# Patient Record
Sex: MALE | Race: WHITE | Employment: UNEMPLOYED | URBAN - METROPOLITAN AREA
[De-identification: names, ages, dates, MRNs, and addresses within clinical notes are randomized per-mention and may not be internally consistent; named-entity substitution may affect disease eponyms.]

---

## 2017-09-22 ENCOUNTER — HOSPITAL ENCOUNTER (EMERGENCY)
Age: 27
Discharge: HOME | End: 2017-09-22
Attending: EMERGENCY MEDICINE
Payer: SELF-PAY

## 2017-09-22 VITALS
DIASTOLIC BLOOD PRESSURE: 85 MMHG | SYSTOLIC BLOOD PRESSURE: 126 MMHG | RESPIRATION RATE: 13 BRPM | HEART RATE: 86 BPM | TEMPERATURE: 97.6 F | BODY MASS INDEX: 25.11 KG/M2 | WEIGHT: 160 LBS | OXYGEN SATURATION: 100 % | HEIGHT: 67 IN

## 2017-09-22 DIAGNOSIS — R73.9 HYPERGLYCEMIA: ICD-10-CM

## 2017-09-22 DIAGNOSIS — F11.10 OPIATE ABUSE, EPISODIC (HCC): Primary | ICD-10-CM

## 2017-09-22 DIAGNOSIS — T40.601A OVERDOSE OPIATE, ACCIDENTAL OR UNINTENTIONAL, INITIAL ENCOUNTER (HCC): ICD-10-CM

## 2017-09-22 LAB
ALBUMIN SERPL-MCNC: 3.9 G/DL (ref 3.4–5)
ALBUMIN/GLOB SERPL: 1.4 {RATIO} (ref 0.8–1.7)
ALP SERPL-CCNC: 78 U/L (ref 45–117)
ALT SERPL-CCNC: 38 U/L (ref 16–61)
ANION GAP SERPL CALC-SCNC: 8 MMOL/L (ref 3–18)
AST SERPL-CCNC: 46 U/L (ref 15–37)
BASOPHILS # BLD: 0 K/UL (ref 0–0.06)
BASOPHILS NFR BLD: 0 % (ref 0–2)
BILIRUB SERPL-MCNC: 0.4 MG/DL (ref 0.2–1)
BUN SERPL-MCNC: 11 MG/DL (ref 7–18)
BUN/CREAT SERPL: 8 (ref 12–20)
CALCIUM SERPL-MCNC: 8.6 MG/DL (ref 8.5–10.1)
CHLORIDE SERPL-SCNC: 106 MMOL/L (ref 100–108)
CO2 SERPL-SCNC: 32 MMOL/L (ref 21–32)
CREAT SERPL-MCNC: 1.42 MG/DL (ref 0.6–1.3)
DIFFERENTIAL METHOD BLD: ABNORMAL
EOSINOPHIL # BLD: 0.1 K/UL (ref 0–0.4)
EOSINOPHIL NFR BLD: 1 % (ref 0–5)
ERYTHROCYTE [DISTWIDTH] IN BLOOD BY AUTOMATED COUNT: 13.2 % (ref 11.6–14.5)
ETHANOL SERPL-MCNC: <3 MG/DL (ref 0–3)
GLOBULIN SER CALC-MCNC: 2.8 G/DL (ref 2–4)
GLUCOSE SERPL-MCNC: 241 MG/DL (ref 74–99)
HCT VFR BLD AUTO: 45.7 % (ref 36–48)
HGB BLD-MCNC: 15.5 G/DL (ref 13–16)
LIPASE SERPL-CCNC: 128 U/L (ref 73–393)
LYMPHOCYTES # BLD: 2.7 K/UL (ref 0.9–3.6)
LYMPHOCYTES NFR BLD: 22 % (ref 21–52)
MCH RBC QN AUTO: 31.1 PG (ref 24–34)
MCHC RBC AUTO-ENTMCNC: 33.9 G/DL (ref 31–37)
MCV RBC AUTO: 91.6 FL (ref 74–97)
MONOCYTES # BLD: 0.8 K/UL (ref 0.05–1.2)
MONOCYTES NFR BLD: 6 % (ref 3–10)
NEUTS SEG # BLD: 8.6 K/UL (ref 1.8–8)
NEUTS SEG NFR BLD: 71 % (ref 40–73)
PLATELET # BLD AUTO: 197 K/UL (ref 135–420)
PMV BLD AUTO: 9.7 FL (ref 9.2–11.8)
POTASSIUM SERPL-SCNC: 3.8 MMOL/L (ref 3.5–5.5)
PROT SERPL-MCNC: 6.7 G/DL (ref 6.4–8.2)
RBC # BLD AUTO: 4.99 M/UL (ref 4.7–5.5)
SODIUM SERPL-SCNC: 146 MMOL/L (ref 136–145)
WBC # BLD AUTO: 12.2 K/UL (ref 4.6–13.2)

## 2017-09-22 PROCEDURE — 93005 ELECTROCARDIOGRAM TRACING: CPT

## 2017-09-22 PROCEDURE — 99284 EMERGENCY DEPT VISIT MOD MDM: CPT

## 2017-09-22 PROCEDURE — 96361 HYDRATE IV INFUSION ADD-ON: CPT

## 2017-09-22 PROCEDURE — 80307 DRUG TEST PRSMV CHEM ANLYZR: CPT | Performed by: PHYSICIAN ASSISTANT

## 2017-09-22 PROCEDURE — 80053 COMPREHEN METABOLIC PANEL: CPT | Performed by: PHYSICIAN ASSISTANT

## 2017-09-22 PROCEDURE — 74011250636 HC RX REV CODE- 250/636: Performed by: PHYSICIAN ASSISTANT

## 2017-09-22 PROCEDURE — 83690 ASSAY OF LIPASE: CPT | Performed by: PHYSICIAN ASSISTANT

## 2017-09-22 PROCEDURE — 85025 COMPLETE CBC W/AUTO DIFF WBC: CPT | Performed by: PHYSICIAN ASSISTANT

## 2017-09-22 PROCEDURE — 96374 THER/PROPH/DIAG INJ IV PUSH: CPT

## 2017-09-22 RX ORDER — NALOXONE HYDROCHLORIDE 1 MG/ML
1 INJECTION INTRAMUSCULAR; INTRAVENOUS; SUBCUTANEOUS
Status: DISCONTINUED | OUTPATIENT
Start: 2017-09-22 | End: 2017-09-22

## 2017-09-22 RX ORDER — ONDANSETRON 2 MG/ML
4 INJECTION INTRAMUSCULAR; INTRAVENOUS
Status: COMPLETED | OUTPATIENT
Start: 2017-09-22 | End: 2017-09-22

## 2017-09-22 RX ADMIN — SODIUM CHLORIDE 1000 ML: 900 INJECTION, SOLUTION INTRAVENOUS at 22:34

## 2017-09-22 RX ADMIN — ONDANSETRON 4 MG: 2 INJECTION INTRAMUSCULAR; INTRAVENOUS at 22:34

## 2017-09-23 NOTE — ED NOTES
Discharge instructions given to pt. pt verbalized understanding discharge instructions. Patient left emergency department by foot  With mother, in good condition. 0 Prescriptions given. Armband removed and shredded.

## 2017-09-23 NOTE — ED PROVIDER NOTES
HPI Comments: 10:09 PM  Kamille Rivera is a 32 y.o. male with PMHX hepatitis B and C and hx of heroine abuse presenting to the ED via EMS C/O unresponsiveness due to possible overdose, onset 30 minutes PTA. Associated sxs include decreased respiratory rate. Per EMS, pt's friends called EMS after pt disappeared into the bathroom for 30 minutes. EMS found pt in his bathroom at home laying supine, unresponsive, and breathing 8 times/minute. EMS gave 1 of Narcan and 4 of Zofran enroute, which gradually increased pt's respiratory rate, and pt became more responsive. Per EMS, pt is 99% on 12L NRB, /70, FSBS 225. Pt is awake and alert in the ED. Pt states he took 4 \"pain pills\" today, sts they were morphine but unsure of dosage. Pt notes he had half a beer today. EMS notes that pt's left hand is swollen, however per family, that is chronic in nature. Pt denies PMHX DM, CP, SOB, SI, HI, and any other symptoms or complaints. Written by RAINER Mccurdyibhi, as dictated by Yolanda Rangel PA-C     The history is provided by the EMS personnel. No  was used. Past Medical History:   Diagnosis Date    Anxiety     Hepatitis B     Hepatitis C     Heroin abuse        Past Surgical History:   Procedure Laterality Date    HX ANKLE FRACTURE TX           History reviewed. No pertinent family history. Social History     Social History    Marital status: SINGLE     Spouse name: N/A    Number of children: N/A    Years of education: N/A     Occupational History    Not on file.      Social History Main Topics    Smoking status: Current Every Day Smoker     Packs/day: 1.50    Smokeless tobacco: Not on file    Alcohol use Yes    Drug use: Yes     Special: Heroin, Cocaine    Sexual activity: Not on file     Other Topics Concern    Not on file     Social History Narrative    ** Merged History Encounter **              ALLERGIES: Review of patient's allergies indicates no known allergies. Review of Systems   Constitutional:        (+) overdose   Respiratory: Negative for shortness of breath. (+) decreased respiratory rate   Cardiovascular: Negative for chest pain. Musculoskeletal: Positive for joint swelling (chronic left hand). Psychiatric/Behavioral: Negative for suicidal ideas. (-) HI   All other systems reviewed and are negative. Vitals:    09/22/17 2216   BP: 126/85   Pulse: 86   Resp: 13   Temp: 97.6 °F (36.4 °C)   SpO2: 100%   Weight: 72.6 kg (160 lb)   Height: 5' 7\" (1.702 m)            Physical Exam   Constitutional: He is oriented to person, place, and time. He appears well-developed and well-nourished. No distress. Groggy, but arouses to voice, answers questions   HENT:   Head: Normocephalic and atraumatic. Mouth/Throat: Uvula is midline and oropharynx is clear and moist. Mucous membranes are dry. Poor dentition      Eyes: EOM are normal. Pupils are equal, round, and reactive to light. Neck: Normal range of motion. Neck supple. Cardiovascular: Normal rate, regular rhythm, normal heart sounds and intact distal pulses. No murmur heard. Pulmonary/Chest: Effort normal and breath sounds normal. No respiratory distress. He has no wheezes. He has no rales. Abdominal: Soft. Bowel sounds are normal. He exhibits no distension. There is no tenderness. There is no rebound and no guarding. Musculoskeletal:   Minimal swelling of the left hand, non tender   Neurological: He is alert and oriented to person, place, and time. Skin: Skin is warm. He is diaphoretic (slight ). Psychiatric: He has a normal mood and affect. Judgment normal.   Nursing note and vitals reviewed. RESULTS:    CARDIAC MONITOR NOTE:  Cardiac Rhythm: NSR  Rate: 67 bpm     PULSE OXIMETRY NOTE:  Pulse-ox is 100% on RA  Interpretation: normal       EKG interpretation: (Preliminary)  10:13 PM   NSR, 66 bpm, QR pattern in only V1. No blocks or arrhythmia.    EKG read by Wilfrido Irwin Tim Randle MD     No orders to display        Labs Reviewed   CBC WITH AUTOMATED DIFF - Abnormal; Notable for the following:        Result Value    ABS. NEUTROPHILS 8.6 (*)     All other components within normal limits   METABOLIC PANEL, COMPREHENSIVE - Abnormal; Notable for the following:     Sodium 146 (*)     Glucose 241 (*)     Creatinine 1.42 (*)     BUN/Creatinine ratio 8 (*)     AST (SGOT) 46 (*)     All other components within normal limits   LIPASE   ETHYL ALCOHOL       Recent Results (from the past 12 hour(s))   CBC WITH AUTOMATED DIFF    Collection Time: 09/22/17 10:10 PM   Result Value Ref Range    WBC 12.2 4.6 - 13.2 K/uL    RBC 4.99 4.70 - 5.50 M/uL    HGB 15.5 13.0 - 16.0 g/dL    HCT 45.7 36.0 - 48.0 %    MCV 91.6 74.0 - 97.0 FL    MCH 31.1 24.0 - 34.0 PG    MCHC 33.9 31.0 - 37.0 g/dL    RDW 13.2 11.6 - 14.5 %    PLATELET 266 077 - 457 K/uL    MPV 9.7 9.2 - 11.8 FL    NEUTROPHILS 71 40 - 73 %    LYMPHOCYTES 22 21 - 52 %    MONOCYTES 6 3 - 10 %    EOSINOPHILS 1 0 - 5 %    BASOPHILS 0 0 - 2 %    ABS. NEUTROPHILS 8.6 (H) 1.8 - 8.0 K/UL    ABS. LYMPHOCYTES 2.7 0.9 - 3.6 K/UL    ABS. MONOCYTES 0.8 0.05 - 1.2 K/UL    ABS. EOSINOPHILS 0.1 0.0 - 0.4 K/UL    ABS. BASOPHILS 0.0 0.0 - 0.06 K/UL    DF AUTOMATED     METABOLIC PANEL, COMPREHENSIVE    Collection Time: 09/22/17 10:10 PM   Result Value Ref Range    Sodium 146 (H) 136 - 145 mmol/L    Potassium 3.8 3.5 - 5.5 mmol/L    Chloride 106 100 - 108 mmol/L    CO2 32 21 - 32 mmol/L    Anion gap 8 3.0 - 18 mmol/L    Glucose 241 (H) 74 - 99 mg/dL    BUN 11 7.0 - 18 MG/DL    Creatinine 1.42 (H) 0.6 - 1.3 MG/DL    BUN/Creatinine ratio 8 (L) 12 - 20      GFR est AA >60 >60 ml/min/1.73m2    GFR est non-AA >60 >60 ml/min/1.73m2    Calcium 8.6 8.5 - 10.1 MG/DL    Bilirubin, total 0.4 0.2 - 1.0 MG/DL    ALT (SGPT) 38 16 - 61 U/L    AST (SGOT) 46 (H) 15 - 37 U/L    Alk.  phosphatase 78 45 - 117 U/L    Protein, total 6.7 6.4 - 8.2 g/dL    Albumin 3.9 3.4 - 5.0 g/dL Globulin 2.8 2.0 - 4.0 g/dL    A-G Ratio 1.4 0.8 - 1.7     LIPASE    Collection Time: 09/22/17 10:10 PM   Result Value Ref Range    Lipase 128 73 - 393 U/L   ETHYL ALCOHOL    Collection Time: 09/22/17 10:10 PM   Result Value Ref Range    ALCOHOL(ETHYL),SERUM <3 0 - 3 MG/DL   EKG, 12 LEAD, INITIAL    Collection Time: 09/22/17 10:13 PM   Result Value Ref Range    Ventricular Rate 66 BPM    Atrial Rate 66 BPM    P-R Interval 144 ms    QRS Duration 86 ms    Q-T Interval 394 ms    QTC Calculation (Bezet) 413 ms    Calculated P Axis 73 degrees    Calculated R Axis 75 degrees    Calculated T Axis 56 degrees    Diagnosis       Normal sinus rhythm  RSR' or QR pattern in V1 suggests right ventricular conduction delay  Borderline ECG  When compared with ECG of 24-DEC-2016 17:35,  No significant change was found          MDM  Number of Diagnoses or Management Options  Hyperglycemia:   Opiate abuse, episodic:   Overdose opiate, accidental or unintentional, initial encounter:   Diagnosis management comments: Overdose, intoxication, electrolyte imbalance, DKA, dehydration, arrhythmia        Amount and/or Complexity of Data Reviewed  Clinical lab tests: ordered and reviewed  Tests in the medicine section of CPT®: ordered and reviewed (EKG)  Discuss the patient with other providers: yes Jojo Bhakta MD (Emergency Medicine))  Independent visualization of images, tracings, or specimens: yes (EKG)      ED Course     MEDICATIONS GIVEN:  Medications   ondansetron [facility] injection 4 mg (4 mg IntraVENous Given 9/22/17 2234)   sodium chloride 0.9 % bolus infusion 1,000 mL (0 mL IntraVENous IV Completed 9/22/17 2313)        Procedures      PROGRESS NOTE:  10:09 PM  Initial assessment performed. Written by Andrew Cifuentes, ED Scribe, as dictated by Martha Mccallum PA-C    PROGRESS NOTE:   10:32 PM  Per RN, pt is refusing the Zofran and Narcan.  States he would like a drink of water and if he is able to stand \"I'll drink out of the mariam. \"  Written by [facility], ED Scribe, as dictated by Ami Pavon PA-C.      CONSULT NOTE:   10:53 PM  Ami Pavon PA-C spoke with Tessie Styles. Taylor Martinez MD   Specialty: Emergency Medicine  Discussed pt's hx, disposition, and available diagnostic and imaging results. Reviewed care plans. Consulting physician agrees with plans as outlined. States observation time for pt depends on the medication that he took. Otherwise, agrees with plans. Written by [facility], ED Scribe, as dictated by Ami Pavon PA-C. PROGRESS NOTE:   11:00 PM  Pt has been re-examined by Ami Pavon PA-C. Pt is still refusing medication. States he would like to go home. I discussed with him the need for further evaluation of his condition. Pt is adamant that he would like to go home. Written by [facility], ED Scribe, as dictated by Ami Pavon PA-C. I informed the pt that He needed further lab evaluation for adequate evaluation for his symptoms, that by refusing observation and workup He is at risk for repeat respiratory or cardiac arrest.  He is awake, alert, He understands his condition and the risks involved in leaving. When having this conversation pt is clinically aware of his surroundings and able to ask appropriate questions, the patients nurse is present and confirms He is not clinically intoxicated and able to make medical decisions. He verbalized knowing the same risks and understood it was recommended that He stay and could also return at any time. Pt also verbalized that He understood both diagnosis, risks and could return at any time. Pt was provided with warnings regarding worsening of his condition and provided instructions to follow up with [facility] tomorrow or return to the Emergency Room as soon as possible. This discussion was witnessed by Apurva oMralez RN. DISCHARGE NOTE:  11:11 PM  Myrna Kramer's  results have been reviewed with him.   He has been counseled regarding his diagnosis, treatment, and plan.  He verbally conveys understanding and agreement of the signs, symptoms, diagnosis, treatment and prognosis and additionally agrees to follow up as discussed. He also agrees with the care-plan and conveys that all of his questions have been answered. I have also provided discharge instructions for him that include: educational information regarding their diagnosis and treatment, and list of reasons why they would want to return to the ED prior to their follow-up appointment, should his condition change. He has been provided with education for proper emergency department utilization. CLINICAL IMPRESSION:    1. Opiate abuse, episodic    2. Overdose opiate, accidental or unintentional, initial encounter    3. Hyperglycemia        PLAN:  1. D/C Home  2. Discharge Medication List as of 9/22/2017 11:18 PM        3. Follow-up Information     Follow up With Details Comments Contact Info    [facility] Schedule an appointment as soon as possible for a visit in 1 day  [id number] [facility], [address] [facility],[address]    [facility] EMERGENCY DEPT  As needed, If symptoms worsen [address]ia  [address] [address]  [phone]          SCRIBE ATTESTATION:  This note is prepared by Maxwell Mueller, acting as Scribe for Lalo Quintero PA-C     PROVIDER ATTESTATION:  Lalo Quintero PA-C: The scribe's documentation has been prepared under my direction and personally reviewed by me in its entirety. I confirm that the note above accurately reflects all work, treatment, procedures, and medical decision making performed by me.

## 2017-09-23 NOTE — ED TRIAGE NOTES
Patient arrived via rescue. Per rescue, 911 was called by friends on scene after patient disappeared into bathroom for approximately 30 minutes. Rescue states upon their arrival, patient was found unresponsive with a respiratory rate of 8. Patient's ventilations were assisted via BVM and patient was administered 1mg of Narcan via IV. Rescue reports patient then began to arouse and respiratory drive increased. Patient arouses to stimulation upon arrival. Patient able to answer questions appropriately. Patient states he \"does not remember\" the events proceeding his arrival to the hospital. Patient admits to take approximately four \"pain pills\" but is unsure of name. Patient also admits to drinking \"a half a beer. \" Patient denies any other drug use. Patient denies any self-harm attempts. Sepsis Screening completed    (  )Patient meets SIRS criteria. ( x )Patient does not meet SIRS criteria.       SIRS Criteria is achieved when two or more of the following are present   Temperature < 96.8°F (36°C) or > 100.9°F (38.3°C)   Heart Rate > 90 beats per minute   Respiratory Rate > 20 breaths per minute   WBC count > 12,000 or <4,000 or > 10% bands

## 2017-09-23 NOTE — ED NOTES
Patient's mother on phone requesting to speak with patient. Per patient, he's okay to speak with mother. Patient's belongings given to security.

## 2017-09-23 NOTE — DISCHARGE INSTRUCTIONS
Opioid Overdose: Care Instructions  Your Care Instructions    You have had treatment to help your body recover from taking too much of an opioid. You are getting better, but you may not feel well for a while. It takes time for the opioids to leave your body. How long it takes to feel better depends on which drug you took and how much you took of it. Opioids include illegal drugs such as heroin, often called smack, junk, H, and ska. Opioids also include medicines that doctors prescribe to treat pain. These are medicines such as oxycodone, methadone, and buprenorphine. They are sometimes sold and used illegally. Taking too much of an opioid can be dangerous. It may cause:  · Trouble breathing. · Low blood pressure. · A low heart rate. · A coma. When the doctor treated you for the overdose, he or she may have:  · Watched your symptoms or done tests to find out what kind of drug you took. · Given you fluids. · Given you oxygen to help you breathe. · Given you a medicine called naloxone to help reverse the effects of the opioid. · Done several tests, including blood tests, to see how you're responding to treatment. The doctor also watched you carefully to make sure you were recovering safely. Follow-up care is a key part of your treatment and safety. Be sure to make and go to all appointments, and call your doctor if you are having problems. It's also a good idea to know your test results and keep a list of the medicines you take. How can you care for yourself at home? · If you take opioids regularly, your body gets used to them. This is called dependency. If you are dependent on this drug, you may have withdrawal symptoms when you stop taking it. These can include nausea, sweating, chills, diarrhea, stomach cramps, and muscle aches. Withdrawal can last up to several weeks, depending on which drug you took. You may feel very ill, but you are probably not in medical danger.   · Your doctor may give you medicine to help you feel better. To help get through withdrawal, you can also:  ¨ Get plenty of rest.  ¨ Drink plenty of fluids. ¨ Stay active, but don't tire yourself. ¨ Eat a healthy diet. · If you had a tube in your throat to help you breathe, you may have a sore throat or hoarseness that can last a few days. Sip liquids to help soothe your throat. · Do not drink alcohol or take illegal drugs. · Do not drive if you feel sleepy or groggy while you recover from your overdose. · Get help to stop using drugs. Talk to your doctor about drug treatment programs. · Talk to your doctor or pharmacist about having a naloxone rescue kit on hand. When should you call for help? Call 911 anytime you think you may need emergency care. For example, call if:  · You feel you cannot stop from hurting yourself or someone else. Call your doctor now or seek immediate medical care if:  · You have new or worse withdrawal symptoms, such as:  ¨ Stomach cramps. ¨ Vomiting. ¨ Diarrhea. ¨ Muscle aches. ¨ Sweating. Watch closely for changes in your health, and be sure to contact your doctor if:  · You do not get better as expected. Where can you learn more? Go to http://sujatha-danielle.info/. Enter 872 18 [id number] in the search box to learn more about \"Opioid Overdose: Care Instructions. \"  Current as of: February 21, 2017  Content Version: 11.3  © 9362-9423 [employer]. Care instructions adapted under license by [employer] (which disclaims liability or warranty for this information). If you have questions about a medical condition or this instruction, always ask your healthcare professional. [place] [address] any warranty or liability for your use of this information.

## 2017-09-23 NOTE — ED NOTES
Patient more alert. Patient refuses medications at this time. Patient requesting water. Patient informed that he is unable to have any food or drink until labs have resulted. Patient states \"If I can make it out of this bed and to that sink, I'm going to drink something. \" PA informed and at bedside to speak with patient. Patient states \"I don't even want to be here. \" Patient informed of risks at this time and agrees to stay until labs have resulted.

## 2017-09-24 LAB
ATRIAL RATE: 66 BPM
CALCULATED P AXIS, ECG09: 73 DEGREES
CALCULATED R AXIS, ECG10: 75 DEGREES
CALCULATED T AXIS, ECG11: 56 DEGREES
DIAGNOSIS, 93000: NORMAL
P-R INTERVAL, ECG05: 144 MS
Q-T INTERVAL, ECG07: 394 MS
QRS DURATION, ECG06: 86 MS
QTC CALCULATION (BEZET), ECG08: 413 MS
VENTRICULAR RATE, ECG03: 66 BPM

## 2019-08-14 ENCOUNTER — HOSPITAL ENCOUNTER (EMERGENCY)
Age: 29
Discharge: HOME | End: 2019-08-14
Attending: EMERGENCY MEDICINE
Payer: MEDICAID

## 2019-08-14 VITALS
SYSTOLIC BLOOD PRESSURE: 124 MMHG | TEMPERATURE: 99.1 F | HEART RATE: 50 BPM | WEIGHT: 180 LBS | BODY MASS INDEX: 28.25 KG/M2 | HEIGHT: 67 IN | DIASTOLIC BLOOD PRESSURE: 78 MMHG | OXYGEN SATURATION: 100 % | RESPIRATION RATE: 16 BRPM

## 2019-08-14 DIAGNOSIS — F41.1 ANXIETY STATE: Primary | ICD-10-CM

## 2019-08-14 PROCEDURE — 74011250637 HC RX REV CODE- 250/637: Performed by: PHYSICIAN ASSISTANT

## 2019-08-14 PROCEDURE — 99283 EMERGENCY DEPT VISIT LOW MDM: CPT

## 2019-08-14 RX ORDER — HYDROXYZINE PAMOATE 100 MG/1
100 CAPSULE ORAL
COMMUNITY

## 2019-08-14 RX ORDER — LEVOTHYROXINE SODIUM 75 UG/1
TABLET ORAL
COMMUNITY

## 2019-08-14 RX ORDER — HYDROXYZINE 50 MG/1
50 TABLET, FILM COATED ORAL
Qty: 10 TAB | Refills: 0 | Status: SHIPPED | OUTPATIENT
Start: 2019-08-14

## 2019-08-14 RX ORDER — OLANZAPINE 15 MG/1
15 TABLET ORAL
COMMUNITY

## 2019-08-14 RX ORDER — LITHIUM CARBONATE 300 MG/1
CAPSULE ORAL
COMMUNITY

## 2019-08-14 RX ORDER — LORAZEPAM 1 MG/1
2 TABLET ORAL
Status: COMPLETED | OUTPATIENT
Start: 2019-08-14 | End: 2019-08-14

## 2019-08-14 RX ADMIN — LORAZEPAM 2 MG: 1 TABLET ORAL at 21:33

## 2019-08-15 NOTE — DISCHARGE INSTRUCTIONS
Patient Education        Learning About Anxiety Disorders  What are anxiety disorders? Anxiety disorders are a type of medical problem. They cause severe anxiety. When you feel anxious, you feel that something bad is about to happen. This feeling interferes with your life. These disorders include:  · Generalized anxiety disorder. You feel worried and stressed about many everyday events and activities. This goes on for several months and disrupts your life on most days. · Panic disorder. You have repeated panic attacks. A panic attack is a sudden, intense fear or anxiety. It may make you feel short of breath. Your heart may pound. · Social anxiety disorder. You feel very anxious about what you will say or do in front of people. For example, you may be scared to talk or eat in public. This problem affects your daily life. · Phobias. You are very scared of a specific object, situation, or activity. For example, you may fear spiders, high places, or small spaces. What are the symptoms? Generalized anxiety disorder  Symptoms may include:  · Feeling worried and stressed about many things almost every day. · Feeling tired or irritable. You may have a hard time concentrating. · Having headaches or muscle aches. · Having a hard time getting to sleep or staying asleep. Panic disorder  You may have repeated panic attacks when there is no reason for feeling afraid. You may change your daily activities because you worry that you will have another attack. Symptoms may include:  · Intense fear, terror, or anxiety. · Trouble breathing or very fast breathing. · Chest pain or tightness. · A heartbeat that races or is not regular. Social anxiety disorder  Symptoms may include:  · Fear about a social situation, such as eating in front of others or speaking in public. You may worry a lot. Or you may be afraid that something bad will happen. · Anxiety that can cause you to blush, sweat, and feel shaky.   · A heartbeat that is faster than normal.  · A hard time focusing. Phobias  Symptoms may include:  · More fear than most people of being around an object, being in a situation, or doing an activity. You might also be stressed about the chance of being around the thing you fear. · Worry about losing control, panicking, fainting, or having physical symptoms like a faster heartbeat when you are around the situation or object. How are these disorders treated? Anxiety disorders can be treated with medicines or counseling. A combination of both may be used. Medicines may include:  · Antidepressants. These may help your symptoms by keeping chemicals in your brain in balance. · Benzodiazepines. These may give you short-term relief of your symptoms. Some people use cognitive-behavioral therapy. A therapist helps you learn to change stressful or bad thoughts into helpful thoughts. Lead a healthy lifestyle  A healthy lifestyle may help you feel better. · Get at least 30 minutes of exercise on most days of the week. Walking is a good choice. · Eat a healthy diet. Include fruits, vegetables, lean proteins, and whole grains in your diet each day. · Try to go to bed at the same time every night. Try for 8 hours of sleep a night. · Find ways to manage stress. Try relaxation exercises. · Avoid alcohol and illegal drugs. Follow-up care is a key part of your treatment and safety. Be sure to make and go to all appointments, and call your doctor if you are having problems. It's also a good idea to know your test results and keep a list of the medicines you take. Where can you learn more? Go to http://sujatha-danielle.info/. Enter F619 in the search box to learn more about \"Learning About Anxiety Disorders. \"  Current as of: September 11, 2018  Content Version: 12.1  © 1326-6440 [employer].  Care instructions adapted under license by [employer] (which disclaims liability or warranty for this information). If you have questions about a medical condition or this instruction, always ask your healthcare professional. [place] [address] any warranty or liability for your use of this information.

## 2019-08-15 NOTE — ED TRIAGE NOTES
Pt present to ED through triage Pt reports has not had medications in 2 weeks. Pt reports increased anxiety, pt states \" all I want to do is sleep\". Pt alert and oriented no acute distress noted pt able to speak in full sentences.

## 2019-08-15 NOTE — ED PROVIDER NOTES
EMERGENCY DEPARTMENT HISTORY AND PHYSICAL EXAM    Date: 8/14/2019  Patient Name: Refugia Cogan    History of Presenting Illness     No chief complaint on file. History Provided By: Patient and his mother    Refugia Cogan is a 29 y.o. male with PMHX of anxiety, polysubstance abuse drug addiction bipolar recent incarceration who presents to the emergency department C/O medication refill. Associated sxs include anxiety. Patient reports recent incarcerations who just got out of group home 2 weeks ago. Patient is taking Zyprexa and lithium while in group home for his mental illness. Patient states upon discharge she had no medications and has been out of them since leaving the group home. He has been in contact with a CSB he has upcoming appointment patient asking for refills of Atarax Zyprexa and lithium. Patient's mother is with him states that she will be taking him tomorrow to a psychiatric facility in [facility] that she is Isaac been in contact with for [employer] to help. Patient expresses concern for being unable to sleep and is asking for something to help him sleep tonight. Pt denies homicidal or suicidal ideation, vomiting, chest pain, shortness of breath, and any other sxs or complaints. PCP: None    Current Facility-Administered Medications   Medication Dose Route Frequency Provider Last Rate Last Dose    LORazepam (ATIVAN) tablet 2 mg  2 mg Oral NOW Dilip Griffith PA         Current Outpatient Medications   Medication Sig Dispense Refill    OLANZapine (ZYPREXA) 15 mg tablet Take 15 mg by mouth nightly.  lithium carbonate 300 mg capsule Take  by mouth three (3) times daily (with meals).  hydrOXYzine pamoate (VISTARIL) 100 mg capsule Take 100 mg by mouth three (3) times daily as needed for Itching.  levothyroxine (SYNTHROID) 75 mcg tablet Take  by mouth Daily (before breakfast).  Indications: hypothyroidism      hydrOXYzine HCl (ATARAX) 50 mg tablet Take 1 Tab by mouth every six (6) hours as needed for Itching. 10 Tab 0    albuterol (PROVENTIL HFA, VENTOLIN HFA, PROAIR HFA) 90 mcg/actuation inhaler Take 2 Puffs by inhalation every four (4) hours as needed for Wheezing. 1 Inhaler 1    ALPRAZOLAM (XANAX PO) Take 1 mg by mouth. Past History     Past Medical History:  Past Medical History:   Diagnosis Date    Anxiety     Bipolar I disorder, current or most recent episode depressed, with psychotic features with anxious distress ([facility]     Hepatitis B     Hepatitis C     Hepatitis C, chronic (HCC)     Heroin abuse ([facility]     Infectious disease     Psychiatric disorder     Schizoaffective disorder, depressive type with good prognostic features ([facility]        Past Surgical History:  Past Surgical History:   Procedure Laterality Date    HX ANKLE FRACTURE TX         Family History:  History reviewed. No pertinent family history. Social History:  Social History     Tobacco Use    Smoking status: Current Every Day Smoker     Packs/day: 1.50    Smokeless tobacco: Never Used   Substance Use Topics    Alcohol use: Yes     Alcohol/week: 2.0 standard drinks     Types: 2 Cans of beer per week    Drug use: Yes     Types: Heroin, Cocaine       Allergies: Allergies   Allergen Reactions    Tylenol [Acetaminophen] Other (comments)     Pt reports has Hep C cant take tylenol            Review of Systems   Review of Systems   Constitutional: Negative for fever. Gastrointestinal: Negative for vomiting. Psychiatric/Behavioral: Positive for sleep disturbance. Negative for agitation, behavioral problems, hallucinations and suicidal ideas. The patient is nervous/anxious. All other systems reviewed and are negative. Physical Exam     Vitals:    08/14/19 2041   BP: 124/78   Pulse: (!) 50   Resp: 16   Temp: 99.1 °F (37.3 °C)   SpO2: 100%   Weight: 81.6 kg (180 lb)   Height: 5' 7\" (1.702 m)     Physical Exam   Constitutional: He is oriented to person, place, and time.  He appears well-developed and well-nourished. No distress. Heavily tattooed sitting in exam chair appears comfortable answers questions appropriately makes good eye contact   HENT:   Head: Normocephalic and atraumatic. Eyes: Pupils are equal, round, and reactive to light. Conjunctivae and EOM are normal.   Neck: Normal range of motion. Neck supple. Cardiovascular: Normal rate and regular rhythm. Pulmonary/Chest: Effort normal and breath sounds normal.   Musculoskeletal: Normal range of motion. Neurological: He is alert and oriented to person, place, and time. Skin: Skin is warm and dry. Psychiatric: His speech is normal and behavior is normal. His mood appears anxious. His affect is not inappropriate. Thought content is not paranoid. Cognition and memory are normal. He expresses no homicidal and no suicidal ideation. Nursing note and vitals reviewed. Diagnostic Study Results     Labs -   No results found for this or any previous visit (from the past 12 hour(s)). Radiologic Studies -   No orders to display     CT Results  (Last 48 hours)    None        CXR Results  (Last 48 hours)    None          Medications given in the ED-  Medications   LORazepam (ATIVAN) tablet 2 mg (has no administration in time range)         Medical Decision Making   I am the first provider for this patient. I reviewed the vital signs, available nursing notes, past medical history, past surgical history, family history and social history. Vital Signs-Reviewed the patient's vital signs. Pulse Oximetry Analysis - 100% on RA     Records Reviewed: Nursing Notes    Procedures:  Procedures    ED Course:   9:08 PM   Initial assessment performed. The patients presenting problems have been discussed, and they are in agreement with the care plan formulated and outlined with them. I have encouraged them to ask questions as they arise throughout their visit.     Discussion: 29 y.o. male with history of mental illness and drug addiction recent incarceration's been out of Zyprexa Atarax and lithium for 2 weeks asking for medication refill. Patient is not suicidal or homicidal.  He has appropriate vital signs. His mother is with him and has plan to take him to a psychiatric facility tomorrow morning she does not need help arranging this. Mother states patient is safe with her. They are asking for refills of medications. Proximal and lithium I will not be writing for patient encouraged to see a CSB for this. And write for 10 Atarax tablets, 1 p.o. Ativan now. Diagnosis and Disposition       DISCHARGE NOTE:  Lucio Kramer's  results have been reviewed with him. He has been counseled regarding his diagnosis, treatment, and plan. He verbally conveys understanding and agreement of the signs, symptoms, diagnosis, treatment and prognosis and additionally agrees to follow up as discussed. He also agrees with the care-plan and conveys that all of his questions have been answered. I have also provided discharge instructions for him that include: educational information regarding their diagnosis and treatment, and list of reasons why they would want to return to the ED prior to their follow-up appointment, should his condition change. He has been provided with education for proper emergency department utilization. CLINICAL IMPRESSION:    1. Anxiety state        PLAN:  1. D/C Home  2. Current Discharge Medication List      START taking these medications    Details   hydrOXYzine HCl (ATARAX) 50 mg tablet Take 1 Tab by mouth every six (6) hours as needed for Itching. Qty: 10 Tab, Refills: 0           3.    Follow-up Information     Follow up With Specialties Details Why Contact Info    your PCP  Schedule an appointment as soon as possible for a visit      [facility] EMERGENCY DEPT Emergency Medicine  As needed, If symptoms worsen [address] Shaji Rao [address] ([facility])  Go to  [address] [facility] [address]              Please note that this dictation was completed with [employer], the computer voice recognition software. Quite often unanticipated grammatical, syntax, homophones, and other interpretive errors are inadvertently transcribed by the computer software. Please disregard these errors. Please excuse any errors that have escaped final proofreading.

## 2019-11-28 ENCOUNTER — HOSPITAL ENCOUNTER (INPATIENT)
Age: 29
LOS: 4 days | Discharge: HOME | DRG: 816 | End: 2019-12-02
Attending: EMERGENCY MEDICINE | Admitting: FAMILY MEDICINE
Payer: MEDICAID

## 2019-11-28 ENCOUNTER — APPOINTMENT (OUTPATIENT)
Dept: CT IMAGING | Age: 29
DRG: 816 | End: 2019-11-28
Attending: PHYSICIAN ASSISTANT
Payer: MEDICAID

## 2019-11-28 ENCOUNTER — APPOINTMENT (OUTPATIENT)
Dept: GENERAL RADIOLOGY | Age: 29
DRG: 816 | End: 2019-11-28
Attending: PHYSICIAN ASSISTANT
Payer: MEDICAID

## 2019-11-28 DIAGNOSIS — R09.02 HYPOXIA: ICD-10-CM

## 2019-11-28 DIAGNOSIS — T40.1X1A OVERDOSE OF HEROIN, ACCIDENTAL OR UNINTENTIONAL, INITIAL ENCOUNTER (HCC): ICD-10-CM

## 2019-11-28 DIAGNOSIS — J81.1 NONCARDIOGENIC PULMONARY EDEMA: Primary | ICD-10-CM

## 2019-11-28 PROBLEM — N28.9 RENAL INSUFFICIENCY: Status: ACTIVE | Noted: 2019-11-28

## 2019-11-28 LAB
ALBUMIN SERPL-MCNC: 3.8 G/DL (ref 3.4–5)
ALBUMIN/GLOB SERPL: 1.4 {RATIO} (ref 0.8–1.7)
ALP SERPL-CCNC: 88 U/L (ref 45–117)
ALT SERPL-CCNC: 51 U/L (ref 16–61)
ANION GAP SERPL CALC-SCNC: 11 MMOL/L (ref 3–18)
APAP SERPL-MCNC: <2 UG/ML (ref 10–30)
APTT PPP: 24.3 SEC (ref 23–36.4)
ARTERIAL PATENCY WRIST A: YES
AST SERPL-CCNC: 38 U/L (ref 10–38)
BASE EXCESS BLD CALC-SCNC: 0 MMOL/L
BASOPHILS # BLD: 0 K/UL (ref 0–0.1)
BASOPHILS NFR BLD: 0 % (ref 0–2)
BDY SITE: NORMAL
BILIRUB SERPL-MCNC: 0.5 MG/DL (ref 0.2–1)
BNP SERPL-MCNC: 32 PG/ML (ref 0–450)
BUN SERPL-MCNC: 12 MG/DL (ref 7–18)
BUN/CREAT SERPL: 9 (ref 12–20)
CALCIUM SERPL-MCNC: 8.6 MG/DL (ref 8.5–10.1)
CHLORIDE SERPL-SCNC: 101 MMOL/L (ref 100–111)
CK MB CFR SERPL CALC: 1.4 % (ref 0–4)
CK MB SERPL-MCNC: 2.3 NG/ML (ref 5–25)
CK SERPL-CCNC: 159 U/L (ref 39–308)
CO2 SERPL-SCNC: 27 MMOL/L (ref 21–32)
CREAT SERPL-MCNC: 1.31 MG/DL (ref 0.6–1.3)
D DIMER PPP FEU-MCNC: 3.61 UG/ML(FEU)
DIFFERENTIAL METHOD BLD: ABNORMAL
EOSINOPHIL # BLD: 0.1 K/UL (ref 0–0.4)
EOSINOPHIL NFR BLD: 1 % (ref 0–5)
ERYTHROCYTE [DISTWIDTH] IN BLOOD BY AUTOMATED COUNT: 14.7 % (ref 11.6–14.5)
ETHANOL SERPL-MCNC: <3 MG/DL (ref 0–3)
GAS FLOW.O2 O2 DELIVERY SYS: NORMAL L/MIN
GAS FLOW.O2 SETTING OXYMISER: 15 L/M
GLOBULIN SER CALC-MCNC: 2.8 G/DL (ref 2–4)
GLUCOSE SERPL-MCNC: 139 MG/DL (ref 74–99)
HCO3 BLD-SCNC: 24.9 MMOL/L (ref 22–26)
HCT VFR BLD AUTO: 51.1 % (ref 36–48)
HGB BLD-MCNC: 17.1 G/DL (ref 13–16)
INR PPP: 0.9 (ref 0.8–1.2)
LACTATE BLD-SCNC: 1.67 MMOL/L (ref 0.4–2)
LYMPHOCYTES # BLD: 2 K/UL (ref 0.9–3.6)
LYMPHOCYTES NFR BLD: 18 % (ref 21–52)
MCH RBC QN AUTO: 29.6 PG (ref 24–34)
MCHC RBC AUTO-ENTMCNC: 33.5 G/DL (ref 31–37)
MCV RBC AUTO: 88.4 FL (ref 74–97)
MONOCYTES # BLD: 0.7 K/UL (ref 0.05–1.2)
MONOCYTES NFR BLD: 6 % (ref 3–10)
NEUTS SEG # BLD: 8.3 K/UL (ref 1.8–8)
NEUTS SEG NFR BLD: 75 % (ref 40–73)
O2/TOTAL GAS SETTING VFR VENT: 100 %
PCO2 BLD: 39.8 MMHG (ref 35–45)
PH BLD: 7.4 [PH] (ref 7.35–7.45)
PLATELET # BLD AUTO: 228 K/UL (ref 135–420)
PMV BLD AUTO: 9.4 FL (ref 9.2–11.8)
PO2 BLD: 88 MMHG (ref 80–100)
POTASSIUM SERPL-SCNC: 3.6 MMOL/L (ref 3.5–5.5)
PROT SERPL-MCNC: 6.6 G/DL (ref 6.4–8.2)
PROTHROMBIN TIME: 12.4 SEC (ref 11.5–15.2)
RBC # BLD AUTO: 5.78 M/UL (ref 4.7–5.5)
SALICYLATES SERPL-MCNC: <1.7 MG/DL (ref 2.8–20)
SAO2 % BLD: 97 % (ref 92–97)
SERVICE CMNT-IMP: NORMAL
SODIUM SERPL-SCNC: 139 MMOL/L (ref 136–145)
SPECIMEN TYPE: NORMAL
TOTAL RESP. RATE, ITRR: 17
TROPONIN I SERPL-MCNC: <0.02 NG/ML (ref 0–0.04)
WBC # BLD AUTO: 11.1 K/UL (ref 4.6–13.2)

## 2019-11-28 PROCEDURE — 83880 ASSAY OF NATRIURETIC PEPTIDE: CPT

## 2019-11-28 PROCEDURE — 71045 X-RAY EXAM CHEST 1 VIEW: CPT

## 2019-11-28 PROCEDURE — 74011250636 HC RX REV CODE- 250/636: Performed by: FAMILY MEDICINE

## 2019-11-28 PROCEDURE — 96374 THER/PROPH/DIAG INJ IV PUSH: CPT

## 2019-11-28 PROCEDURE — 99285 EMERGENCY DEPT VISIT HI MDM: CPT

## 2019-11-28 PROCEDURE — 74011636320 HC RX REV CODE- 636/320: Performed by: EMERGENCY MEDICINE

## 2019-11-28 PROCEDURE — 82803 BLOOD GASES ANY COMBINATION: CPT

## 2019-11-28 PROCEDURE — 36600 WITHDRAWAL OF ARTERIAL BLOOD: CPT

## 2019-11-28 PROCEDURE — 74011250636 HC RX REV CODE- 250/636: Performed by: PHYSICIAN ASSISTANT

## 2019-11-28 PROCEDURE — 85379 FIBRIN DEGRADATION QUANT: CPT

## 2019-11-28 PROCEDURE — 65660000000 HC RM CCU STEPDOWN

## 2019-11-28 PROCEDURE — 93005 ELECTROCARDIOGRAM TRACING: CPT

## 2019-11-28 PROCEDURE — 71275 CT ANGIOGRAPHY CHEST: CPT

## 2019-11-28 PROCEDURE — 74011000258 HC RX REV CODE- 258: Performed by: FAMILY MEDICINE

## 2019-11-28 PROCEDURE — 80178 ASSAY OF LITHIUM: CPT

## 2019-11-28 PROCEDURE — 80307 DRUG TEST PRSMV CHEM ANLYZR: CPT

## 2019-11-28 PROCEDURE — 85025 COMPLETE CBC W/AUTO DIFF WBC: CPT

## 2019-11-28 PROCEDURE — 80074 ACUTE HEPATITIS PANEL: CPT

## 2019-11-28 PROCEDURE — 85730 THROMBOPLASTIN TIME PARTIAL: CPT

## 2019-11-28 PROCEDURE — 74011250636 HC RX REV CODE- 250/636

## 2019-11-28 PROCEDURE — 85610 PROTHROMBIN TIME: CPT

## 2019-11-28 PROCEDURE — 96376 TX/PRO/DX INJ SAME DRUG ADON: CPT

## 2019-11-28 PROCEDURE — 83605 ASSAY OF LACTIC ACID: CPT

## 2019-11-28 PROCEDURE — 82550 ASSAY OF CK (CPK): CPT

## 2019-11-28 PROCEDURE — 96361 HYDRATE IV INFUSION ADD-ON: CPT

## 2019-11-28 PROCEDURE — 80053 COMPREHEN METABOLIC PANEL: CPT

## 2019-11-28 PROCEDURE — 87040 BLOOD CULTURE FOR BACTERIA: CPT

## 2019-11-28 RX ORDER — SODIUM CHLORIDE 9 MG/ML
75 INJECTION, SOLUTION INTRAVENOUS CONTINUOUS
Status: DISCONTINUED | OUTPATIENT
Start: 2019-11-28 | End: 2019-11-29

## 2019-11-28 RX ORDER — LEVOTHYROXINE SODIUM 75 UG/1
75 TABLET ORAL
Status: DISCONTINUED | OUTPATIENT
Start: 2019-11-29 | End: 2019-12-02 | Stop reason: HOSPADM

## 2019-11-28 RX ORDER — VANCOMYCIN 2 GRAM/500 ML IN 0.9 % SODIUM CHLORIDE INTRAVENOUS
2000 ONCE
Status: COMPLETED | OUTPATIENT
Start: 2019-11-28 | End: 2019-11-28

## 2019-11-28 RX ORDER — CLINDAMYCIN PHOSPHATE 600 MG/50ML
600 INJECTION, SOLUTION INTRAVENOUS EVERY 8 HOURS
Status: DISCONTINUED | OUTPATIENT
Start: 2019-11-28 | End: 2019-11-29

## 2019-11-28 RX ORDER — ONDANSETRON 2 MG/ML
4 INJECTION INTRAMUSCULAR; INTRAVENOUS
Status: COMPLETED | OUTPATIENT
Start: 2019-11-28 | End: 2019-11-28

## 2019-11-28 RX ORDER — LITHIUM CARBONATE 300 MG/1
300 CAPSULE ORAL 3 TIMES DAILY
Status: DISCONTINUED | OUTPATIENT
Start: 2019-11-28 | End: 2019-12-02 | Stop reason: HOSPADM

## 2019-11-28 RX ORDER — LEVOFLOXACIN 5 MG/ML
750 INJECTION, SOLUTION INTRAVENOUS EVERY 24 HOURS
Status: DISCONTINUED | OUTPATIENT
Start: 2019-11-28 | End: 2019-12-02 | Stop reason: HOSPADM

## 2019-11-28 RX ORDER — ONDANSETRON 2 MG/ML
INJECTION INTRAMUSCULAR; INTRAVENOUS
Status: DISPENSED
Start: 2019-11-28 | End: 2019-11-29

## 2019-11-28 RX ORDER — NALOXONE HYDROCHLORIDE 1 MG/ML
INJECTION INTRAMUSCULAR; INTRAVENOUS; SUBCUTANEOUS
Status: COMPLETED
Start: 2019-11-28 | End: 2019-11-28

## 2019-11-28 RX ORDER — ONDANSETRON 2 MG/ML
4 INJECTION INTRAMUSCULAR; INTRAVENOUS
Status: DISCONTINUED | OUTPATIENT
Start: 2019-11-28 | End: 2019-12-02 | Stop reason: HOSPADM

## 2019-11-28 RX ADMIN — SODIUM CHLORIDE 1000 ML: 900 INJECTION, SOLUTION INTRAVENOUS at 18:34

## 2019-11-28 RX ADMIN — ONDANSETRON 4 MG: 2 INJECTION INTRAMUSCULAR; INTRAVENOUS at 18:37

## 2019-11-28 RX ADMIN — SODIUM CHLORIDE 75 ML/HR: 900 INJECTION, SOLUTION INTRAVENOUS at 21:56

## 2019-11-28 RX ADMIN — VANCOMYCIN HYDROCHLORIDE 2000 MG: 10 INJECTION, POWDER, LYOPHILIZED, FOR SOLUTION INTRAVENOUS at 21:57

## 2019-11-28 RX ADMIN — LEVOFLOXACIN 750 MG: 5 INJECTION, SOLUTION INTRAVENOUS at 22:12

## 2019-11-28 RX ADMIN — ONDANSETRON 4 MG: 2 INJECTION INTRAMUSCULAR; INTRAVENOUS at 19:23

## 2019-11-28 RX ADMIN — PIPERACILLIN AND TAZOBACTAM 4.5 G: 4; .5 INJECTION, POWDER, LYOPHILIZED, FOR SOLUTION INTRAVENOUS; PARENTERAL at 20:59

## 2019-11-28 RX ADMIN — NALOXONE HYDROCHLORIDE 1 MG: 1 INJECTION PARENTERAL at 19:09

## 2019-11-28 RX ADMIN — IOPAMIDOL 100 ML: 755 INJECTION, SOLUTION INTRAVENOUS at 20:39

## 2019-11-28 RX ADMIN — CLINDAMYCIN PHOSPHATE 600 MG: 600 INJECTION, SOLUTION INTRAVENOUS at 23:42

## 2019-11-28 RX ADMIN — FAMOTIDINE 20 MG: 10 INJECTION, SOLUTION INTRAVENOUS at 22:10

## 2019-11-28 RX ADMIN — SODIUM CHLORIDE 1000 ML: 900 INJECTION, SOLUTION INTRAVENOUS at 18:40

## 2019-11-28 NOTE — ED PROVIDER NOTES
EMERGENCY DEPARTMENT HISTORY AND PHYSICAL EXAM    Date: 11/28/2019  Patient Name: Benjamin    History of Presenting Illness     Chief Complaint   Patient presents with    Drug Overdose         History Provided By: Patient    Chief Complaint: drug overdose    HPI(Context):   6:53 PM  Benjamin is a 29 y.o. male with PMHX of heroin use, bipolar 1, schizoaffective, Hepatitis C, and anxiety who presents to the emergency department via EMS for suspected heroin overdose. Per RN, EMS pt given 4 mg narcan as RR was 4 when patient was found at his home. Family members called EMS. Pt was placed on NC and was 86% on arrival to ED on 2L. Pt placed on non-rebreather with sats in mid 90s. Pt c/o SOB. No chest pain. Associated sxs include cough x few weeks. Pt admits to IV heroin use but denies any other illicit drugs or alcohol. Pt endorses tobacco use. Pt denies fever, chills, chest pain, LE swelling, and any other sxs or complaints.      PCP: None    Current Facility-Administered Medications   Medication Dose Route Frequency Provider Last Rate Last Dose    levoFLOXacin (LEVAQUIN) 750 mg in D5W IVPB  750 mg IntraVENous Q24H Sandy Carter PA-C 100 mL/hr at 11/28/19 2212 750 mg at 11/28/19 2212    clindamycin (CLEOCIN) 600mg NS 50 mL IVPB (premix)  600 mg IntraVENous Q8H Sandy Carter PA-C   600 mg at 11/28/19 2342    Vancomycin-Rx to Dose & Monitor  1 Each Other Rx Dosing/Monitoring Sandy Carter PA-C        vancomycin (VANCOCIN) 1,250 mg in 0.9% sodium chloride 250 mL (VIAL-MATE)  1,250 mg IntraVENous Q12H Sandy Carter PA-C        levothyroxine (SYNTHROID) tablet 75 mcg  75 mcg Oral ACB Wood Rogers MD        lithium carbonate capsule 300 mg  300 mg Oral TID Wood Rogers MD   Stopped at 11/28/19 2200    0.9% sodium chloride infusion  75 mL/hr IntraVENous CONTINUOUS Wood Rogers MD 75 mL/hr at 11/28/19 2156 75 mL/hr at 11/28/19 2156    ondansetron (ZOFRAN) injection 4 mg  4 mg IntraVENous Q4H PRN Stanford Canas MD        famotidine (PF) (PEPCID) 20 mg in 0.9% sodium chloride 10 mL injection  20 mg IntraVENous Q12H Stanford Canas MD   20 mg at 11/28/19 2210       Past History     Past Medical History:  Past Medical History:   Diagnosis Date    Anxiety     Bipolar I disorder, current or most recent episode depressed, with psychotic features with anxious distress [facility]     Hepatitis B     Hepatitis C     Hepatitis C, chronic (HCC)     Heroin abuse ([facility]     HIV (human immunodeficiency virus infection) ([facility]     Infectious disease     Psychiatric disorder     Schizoaffective disorder, depressive type with good prognostic features ([facility]        Past Surgical History:  Past Surgical History:   Procedure Laterality Date    HX ANKLE FRACTURE TX         Family History:  History reviewed. No pertinent family history. Social History:  Social History     Tobacco Use    Smoking status: Current Every Day Smoker     Packs/day: 1.50    Smokeless tobacco: Never Used   Substance Use Topics    Alcohol use: Yes     Alcohol/week: 2.0 standard drinks     Types: 2 Cans of beer per week    Drug use: Yes     Types: Heroin, Cocaine       Allergies: Allergies   Allergen Reactions    Tylenol [Acetaminophen] Other (comments)     Pt reports has Hep C cant take tylenol            Review of Systems   Review of Systems   Constitutional: Negative for chills and fever. Respiratory: Positive for cough and shortness of breath. Negative for wheezing. Cardiovascular: Negative for chest pain and leg swelling. Gastrointestinal: Positive for nausea. Negative for vomiting. Neurological: Negative for dizziness and light-headedness. Psychiatric/Behavioral: The patient is nervous/anxious. All other systems reviewed and are negative.       Physical Exam     Vitals:    11/29/19 0155 11/29/19 0504 11/29/19 0506 11/29/19 0731   BP: 91/56 99/57  103/60   Pulse: 61 84  71   Resp: 20 18  16 Temp: 99.1 °F (37.3 °C) 98.2 °F (36.8 °C)  98.4 °F (36.9 °C)   SpO2: 93% 97%  94%   Weight:   76.7 kg (169 lb)    Height:         Physical Exam  Vitals signs and nursing note reviewed. Constitutional:       General: He is awake. Appearance: He is well-developed. He is not diaphoretic. Comments:  male that appears anxious. Mild tachypnea. On NC   HENT:      Head: Normocephalic and atraumatic. Right Ear: External ear normal.      Left Ear: External ear normal.      Nose: Nose normal.   Eyes:      General: No scleral icterus. Right eye: No discharge. Left eye: No discharge. Conjunctiva/sclera: Conjunctivae normal.   Neck:      Musculoskeletal: Normal range of motion and neck supple. Cardiovascular:      Rate and Rhythm: Normal rate and regular rhythm. Heart sounds: Normal heart sounds. No murmur. No friction rub. No gallop. Pulmonary:      Effort: Pulmonary effort is normal. Tachypnea present. No accessory muscle usage or respiratory distress. Breath sounds: Normal breath sounds. No decreased breath sounds, wheezing, rhonchi or rales. Abdominal:      Palpations: Abdomen is soft. Musculoskeletal: Normal range of motion. General: No tenderness. Lymphadenopathy:      Cervical: No cervical adenopathy. Skin:     General: Skin is warm and dry. Neurological:      Mental Status: He is alert and oriented to person, place, and time.    Psychiatric:         Judgment: Judgment normal.             Diagnostic Study Results     Labs -     Recent Results (from the past 12 hour(s))   METABOLIC PANEL, COMPREHENSIVE    Collection Time: 11/29/19 12:50 AM   Result Value Ref Range    Sodium 140 136 - 145 mmol/L    Potassium 4.1 3.5 - 5.5 mmol/L    Chloride 106 100 - 111 mmol/L    CO2 27 21 - 32 mmol/L    Anion gap 7 3.0 - 18 mmol/L    Glucose 95 74 - 99 mg/dL    BUN 11 7.0 - 18 MG/DL    Creatinine 0.98 0.6 - 1.3 MG/DL    BUN/Creatinine ratio 11 (L) 12 - 20 GFR est AA >60 >60 ml/min/1.73m2    GFR est non-AA >60 >60 ml/min/1.73m2    Calcium 7.9 (L) 8.5 - 10.1 MG/DL    Bilirubin, total 1.0 0.2 - 1.0 MG/DL    ALT (SGPT) 38 16 - 61 U/L    AST (SGOT) 23 10 - 38 U/L    Alk. phosphatase 75 45 - 117 U/L    Protein, total 5.4 (L) 6.4 - 8.2 g/dL    Albumin 3.0 (L) 3.4 - 5.0 g/dL    Globulin 2.4 2.0 - 4.0 g/dL    A-G Ratio 1.3 0.8 - 1.7     CBC W/O DIFF    Collection Time: 11/29/19 12:50 AM   Result Value Ref Range    WBC 21.7 (H) 4.6 - 13.2 K/uL    RBC 5.14 4.70 - 5.50 M/uL    HGB 15.2 13.0 - 16.0 g/dL    HCT 45.8 36.0 - 48.0 %    MCV 89.1 74.0 - 97.0 FL    MCH 29.6 24.0 - 34.0 PG    MCHC 33.2 31.0 - 37.0 g/dL    RDW 14.7 (H) 11.6 - 14.5 %    PLATELET 455 941 - 418 K/uL    MPV 9.4 9.2 - 11.8 FL   CARDIAC PANEL,(CK, CKMB & TROPONIN)    Collection Time: 11/29/19 12:50 AM   Result Value Ref Range     39 - 308 U/L    CK - MB 1.8 <3.6 ng/ml    CK-MB Index 1.5 0.0 - 4.0 %    Troponin-I, QT <0.02 0.0 - 0.045 NG/ML   CARDIAC PANEL,(CK, CKMB & TROPONIN)    Collection Time: 11/29/19  6:29 AM   Result Value Ref Range     39 - 308 U/L    CK - MB 1.4 <3.6 ng/ml    CK-MB Index 1.3 0.0 - 4.0 %    Troponin-I, QT <0.02 0.0 - 0.045 NG/ML         CTA CHEST W OR W WO CONT   Final Result   IMPRESSION:      No evidence of a pulmonary embolism or aortic dissection. Interstitial and alveolar infiltrates in both lungs diffusely especially along   the posterior aspect of both lower lobes. May represent pneumonia/aspiration   and/or pulmonary edema. XR CHEST PORT   Final Result   IMPRESSION:      Bilateral increased perihilar and lower lobe interstitial lung markings   suggesting possible pulmonary edema/pneumonitis/aspiration. No effusions or   pneumothorax seen. CT Results  (Last 48 hours)               11/28/19 2045  CTA CHEST W OR W WO CONT Final result    Impression:  IMPRESSION:       No evidence of a pulmonary embolism or aortic dissection.        Interstitial and alveolar infiltrates in both lungs diffusely especially along   the posterior aspect of both lower lobes. May represent pneumonia/aspiration   and/or pulmonary edema. Narrative:  EXAM: CTA chest       INDICATION: Chest pain       COMPARISON: December 5, 2011       TECHNIQUE: Axial CT imaging from the thoracic inlet through the diaphragm with   intravenous contrast. Coronal and sagittal MIP reformats were generated. One or   more dose reduction techniques were used on this CT: automated exposure control,   adjustment of the mAs and/or kVp according to patient size, and iterative   reconstruction techniques. The specific techniques used on this CT exam have   been documented in the patient's electronic medical record. Digital Imaging and   Communications in Medicine (DICOM) format image data are available to   nonaffiliated external healthcare facilities or entities on a secure, media   free, reciprocally searchable basis with patient authorization for at least a   12-month period after this study. _______________       FINDINGS:       EXAM QUALITY: Overall exam quality is satisfactory. Pulmonary arterial   enhancement is adequate with suboptimal breath hold and there is moderate   breathing motion artifact. PULMONARY ARTERIES: No evidence of pulmonary embolism. LYMPH Nodes: No enlarged lymph nodes seen. PLEURA: No pleural effusion seen. HEART: Normal without pericardial effusion. VASCULATURE/MEDIASTINUM: Small hiatal hernia present. Is no aortic dissection. LUNGS: There are diffuse bilateral interstitial and alveolar infiltrates   especially along the posterior aspect of all the lobes suggesting pulmonary   edema/pneumonia and/or aspiration. AIRWAY: Patent       UPPER ABDOMEN: Unremarkable. OTHER: No acute or aggressive osseous abnormalities identified.        _______________               CXR Results  (Last 48 hours)               11/28/19 1852  XR CHEST PORT Final result    Impression:  IMPRESSION:       Bilateral increased perihilar and lower lobe interstitial lung markings   suggesting possible pulmonary edema/pneumonitis/aspiration. No effusions or   pneumothorax seen. Narrative:  EXAM: AP portable upright chest       INDICATION: Overdose, aspiration       COMPARISON: December 26, 2016       _______________       FINDINGS:       Cardiac thymic silhouette is normal. There are bilateral increased interstitial   lung markings which may represent aspiration/pneumonitis or pulmonary edema.    There are no effusions or pneumothorax.       _______________                 Medications given in the ED-  Medications   ondansetron (ZOFRAN) 4 mg/2 mL injection (  Canceled Entry 11/28/19 1923)   levoFLOXacin (LEVAQUIN) 750 mg in D5W IVPB (750 mg IntraVENous New Bag 11/28/19 2212)   clindamycin (CLEOCIN) 600mg NS 50 mL IVPB (premix) (600 mg IntraVENous Given 11/28/19 2342)   Vancomycin-Rx to Dose & Monitor (has no administration in time range)   vancomycin (VANCOCIN) 1,250 mg in 0.9% sodium chloride 250 mL (VIAL-MATE) (has no administration in time range)   levothyroxine (SYNTHROID) tablet 75 mcg (has no administration in time range)   lithium carbonate capsule 300 mg (0 mg Oral Held 11/28/19 2200)   0.9% sodium chloride infusion (75 mL/hr IntraVENous New Bag 11/28/19 2156)   ondansetron (ZOFRAN) injection 4 mg (has no administration in time range)   famotidine (PF) (PEPCID) 20 mg in 0.9% sodium chloride 10 mL injection (20 mg IntraVENous Given 11/28/19 2210)   sodium chloride 0.9 % bolus infusion 1,000 mL (0 mL IntraVENous Transfusion Held 11/28/19 2100)   sodium chloride 0.9 % bolus infusion 1,000 mL (0 mL IntraVENous Transfusion Held 11/28/19 2100)   ondansetron (ZOFRAN) injection 4 mg (4 mg IntraVENous Given 11/28/19 1837)   naloxone (NARCAN) 1 mg/mL injection (1 mg  Given 11/28/19 1909)   iopamidol (ISOVUE-370) 76 % injection 100 mL (100 mL IntraVENous Given 11/28/19 2039)   ondansetron [facility]) injection 4 mg (4 mg IntraVENous Given 11/28/19 1923)   vancomycin (VANCOCIN) 2000 mg in  ml infusion (2,000 mg IntraVENous New Bag 11/28/19 2157)   piperacillin-tazobactam (ZOSYN) 4.5 g in 0.9% sodium chloride (MBP/ADV) 100 mL MBP (4.5 g IntraVENous New Bag 11/28/19 2059)         Medical Decision Making   I am the first provider for this patient. I reviewed the vital signs, available nursing notes, past medical history, past surgical history, family history and social history. Vital Signs-Reviewed the patient's vital signs. Pulse Oximetry Analysis -100% on non-rebreather      Records Reviewed: Nursing Notes and Old Medical Records    Provider Notes (Medical Decision Making): opiate OD, aspiration, CAP, bronchitis, PE, ACS/MI, CHF. Doubt dissection    Procedures:  Procedures    ED Course:   6:53 PM Initial assessment performed. The patients presenting problems have been discussed, and they are in agreement with the care plan formulated and outlined with them. I have encouraged them to ask questions as they arise throughout their visit. Diagnosis and Disposition       6:58 PM  Reviewed case with attending who has seen patient. Agrees with workup. Pt remains on non-rebreather. 7:29 PM  Pt vomited on way to CT and not able to tolerate CTA. Brought back to ED and given zofran. 7:37 PM Consult: I discussed care with Dr. Ambar Houston (on call medicine) It was a standard discussion, including history of patients chief complaint, available diagnostic results, and treatment course. Discussed need for admission. Awaiting CTA and UDS. Will turnover to Keyana Cedeno PA-C as my shift is ended. Broad spectrum ABX ordered given concern for aspiration. Initial lactate normal. BC in process. 8:12 PM  Patient's presentation, labs/imaging and plan of care was reviewed with Keyana Cedeno PA-C as part of sign out.   They will follow up on CTA chest and UDS as part of the plan discussed with the patient. Ros Keenan assistance in completion of this plan is greatly appreciated but it should be noted that I will be the provider of record for this patient. Written by Shy Watkins PA-C.       1. Noncardiogenic pulmonary edema    2. Hypoxia    3. Overdose of heroin, accidental or unintentional, initial encounter ([facility] [address] [facility]        PLAN:  1. ADMIT to Tele      _______________________________    Attestations: This note is prepared by Shy Watkins PA-C.  _______________________________    CRITICAL CARE NOTE:  7:30 PM  I have spent 47 minutes of critical care time involved in lab review, consultations with specialist, family decision-making, and documentation. During this entire length of time I was immediately available to the patient. Critical Care: The reason for providing this level of medical care for this critically ill patient was due a critical illness that impaired one or more vital organ systems such that there was a high probability of imminent or life threatening deterioration in the patients condition. This care involved high complexity decision making to assess, manipulate, and support vital system functions, to treat this degreee vital organ system failure and to prevent further life threatening deterioration of the patients condition. Please note that this dictation was completed with [employer], the computer voice recognition software. Quite often unanticipated grammatical, syntax, homophones, and other interpretive errors are inadvertently transcribed by the computer software. Please disregard these errors. Please excuse any errors that have escaped final proofreading.

## 2019-11-29 PROBLEM — R74.8 ELEVATED CREATINE KINASE: Status: ACTIVE | Noted: 2019-11-29

## 2019-11-29 PROBLEM — N28.9 RENAL INSUFFICIENCY: Status: RESOLVED | Noted: 2019-11-28 | Resolved: 2019-11-29

## 2019-11-29 PROBLEM — F31.9 BIPOLAR 1 DISORDER (HCC): Status: ACTIVE | Noted: 2019-11-29

## 2019-11-29 PROBLEM — J18.9 PNEUMONITIS: Status: ACTIVE | Noted: 2019-11-29

## 2019-11-29 LAB
ALBUMIN SERPL-MCNC: 3 G/DL (ref 3.4–5)
ALBUMIN/GLOB SERPL: 1.3 {RATIO} (ref 0.8–1.7)
ALP SERPL-CCNC: 75 U/L (ref 45–117)
ALT SERPL-CCNC: 38 U/L (ref 16–61)
AMPHET UR QL SCN: NEGATIVE
ANION GAP SERPL CALC-SCNC: 7 MMOL/L (ref 3–18)
APPEARANCE UR: CLEAR
AST SERPL-CCNC: 23 U/L (ref 10–38)
BARBITURATES UR QL SCN: NEGATIVE
BENZODIAZ UR QL: NEGATIVE
BILIRUB SERPL-MCNC: 1 MG/DL (ref 0.2–1)
BILIRUB UR QL: NEGATIVE
BUN SERPL-MCNC: 11 MG/DL (ref 7–18)
BUN/CREAT SERPL: 11 (ref 12–20)
CALCIUM SERPL-MCNC: 7.9 MG/DL (ref 8.5–10.1)
CANNABINOIDS UR QL SCN: POSITIVE
CHLORIDE SERPL-SCNC: 106 MMOL/L (ref 100–111)
CK MB CFR SERPL CALC: 1.2 % (ref 0–4)
CK MB CFR SERPL CALC: 1.3 % (ref 0–4)
CK MB CFR SERPL CALC: 1.5 % (ref 0–4)
CK MB SERPL-MCNC: 1.4 NG/ML (ref 5–25)
CK MB SERPL-MCNC: 1.8 NG/ML (ref 5–25)
CK MB SERPL-MCNC: 3.4 NG/ML (ref 5–25)
CK SERPL-CCNC: 109 U/L (ref 39–308)
CK SERPL-CCNC: 123 U/L (ref 39–308)
CK SERPL-CCNC: 278 U/L (ref 39–308)
CO2 SERPL-SCNC: 27 MMOL/L (ref 21–32)
COCAINE UR QL SCN: NEGATIVE
COLOR UR: YELLOW
CREAT SERPL-MCNC: 0.98 MG/DL (ref 0.6–1.3)
ERYTHROCYTE [DISTWIDTH] IN BLOOD BY AUTOMATED COUNT: 14.7 % (ref 11.6–14.5)
GLOBULIN SER CALC-MCNC: 2.4 G/DL (ref 2–4)
GLUCOSE SERPL-MCNC: 95 MG/DL (ref 74–99)
GLUCOSE UR STRIP.AUTO-MCNC: NEGATIVE MG/DL
HCT VFR BLD AUTO: 45.8 % (ref 36–48)
HDSCOM,HDSCOM: ABNORMAL
HGB BLD-MCNC: 15.2 G/DL (ref 13–16)
HGB UR QL STRIP: NEGATIVE
KETONES UR QL STRIP.AUTO: NEGATIVE MG/DL
LEUKOCYTE ESTERASE UR QL STRIP.AUTO: NEGATIVE
LITHIUM SERPL-SCNC: 0.4 MMOL/L (ref 0.6–1.2)
MCH RBC QN AUTO: 29.6 PG (ref 24–34)
MCHC RBC AUTO-ENTMCNC: 33.2 G/DL (ref 31–37)
MCV RBC AUTO: 89.1 FL (ref 74–97)
METHADONE UR QL: NEGATIVE
NITRITE UR QL STRIP.AUTO: NEGATIVE
OPIATES UR QL: POSITIVE
PCP UR QL: NEGATIVE
PH UR STRIP: 6.5 [PH] (ref 5–8)
PLATELET # BLD AUTO: 202 K/UL (ref 135–420)
PMV BLD AUTO: 9.4 FL (ref 9.2–11.8)
POTASSIUM SERPL-SCNC: 4.1 MMOL/L (ref 3.5–5.5)
PROT SERPL-MCNC: 5.4 G/DL (ref 6.4–8.2)
PROT UR STRIP-MCNC: NEGATIVE MG/DL
RBC # BLD AUTO: 5.14 M/UL (ref 4.7–5.5)
SODIUM SERPL-SCNC: 140 MMOL/L (ref 136–145)
SP GR UR REFRACTOMETRY: 1.02 (ref 1–1.03)
TROPONIN I SERPL-MCNC: <0.02 NG/ML (ref 0–0.04)
UROBILINOGEN UR QL STRIP.AUTO: 0.2 EU/DL (ref 0.2–1)
WBC # BLD AUTO: 21.7 K/UL (ref 4.6–13.2)

## 2019-11-29 PROCEDURE — 74011250636 HC RX REV CODE- 250/636: Performed by: PHYSICIAN ASSISTANT

## 2019-11-29 PROCEDURE — 74011250637 HC RX REV CODE- 250/637: Performed by: FAMILY MEDICINE

## 2019-11-29 PROCEDURE — 74011000250 HC RX REV CODE- 250: Performed by: FAMILY MEDICINE

## 2019-11-29 PROCEDURE — 74011250636 HC RX REV CODE- 250/636: Performed by: FAMILY MEDICINE

## 2019-11-29 PROCEDURE — 77010033678 HC OXYGEN DAILY

## 2019-11-29 PROCEDURE — 82550 ASSAY OF CK (CPK): CPT

## 2019-11-29 PROCEDURE — 80307 DRUG TEST PRSMV CHEM ANLYZR: CPT

## 2019-11-29 PROCEDURE — 85027 COMPLETE CBC AUTOMATED: CPT

## 2019-11-29 PROCEDURE — 81003 URINALYSIS AUTO W/O SCOPE: CPT

## 2019-11-29 PROCEDURE — 65660000000 HC RM CCU STEPDOWN

## 2019-11-29 PROCEDURE — 80053 COMPREHEN METABOLIC PANEL: CPT

## 2019-11-29 PROCEDURE — 87389 HIV-1 AG W/HIV-1&-2 AB AG IA: CPT

## 2019-11-29 PROCEDURE — 36415 COLL VENOUS BLD VENIPUNCTURE: CPT

## 2019-11-29 RX ORDER — PERPHENAZINE 2 MG/1
2 TABLET, FILM COATED ORAL 3 TIMES DAILY
COMMUNITY

## 2019-11-29 RX ORDER — LIDOCAINE 4 G/100G
1 PATCH TOPICAL EVERY 24 HOURS
Status: DISCONTINUED | OUTPATIENT
Start: 2019-11-29 | End: 2019-12-02 | Stop reason: HOSPADM

## 2019-11-29 RX ORDER — IBUPROFEN 600 MG/1
600 TABLET ORAL ONCE
Status: COMPLETED | OUTPATIENT
Start: 2019-11-29 | End: 2019-11-29

## 2019-11-29 RX ADMIN — FAMOTIDINE 20 MG: 10 INJECTION, SOLUTION INTRAVENOUS at 21:01

## 2019-11-29 RX ADMIN — LITHIUM CARBONATE 300 MG: 300 CAPSULE, GELATIN COATED ORAL at 08:47

## 2019-11-29 RX ADMIN — LEVOTHYROXINE SODIUM 75 MCG: 75 TABLET ORAL at 08:48

## 2019-11-29 RX ADMIN — LITHIUM CARBONATE 300 MG: 300 CAPSULE, GELATIN COATED ORAL at 16:57

## 2019-11-29 RX ADMIN — LEVOFLOXACIN 750 MG: 5 INJECTION, SOLUTION INTRAVENOUS at 21:01

## 2019-11-29 RX ADMIN — FAMOTIDINE 20 MG: 10 INJECTION, SOLUTION INTRAVENOUS at 08:47

## 2019-11-29 RX ADMIN — VANCOMYCIN HYDROCHLORIDE 1250 MG: 1.25 INJECTION, POWDER, LYOPHILIZED, FOR SOLUTION INTRAVENOUS at 23:38

## 2019-11-29 RX ADMIN — CLINDAMYCIN PHOSPHATE 600 MG: 600 INJECTION, SOLUTION INTRAVENOUS at 08:45

## 2019-11-29 RX ADMIN — VANCOMYCIN HYDROCHLORIDE 1250 MG: 1.25 INJECTION, POWDER, LYOPHILIZED, FOR SOLUTION INTRAVENOUS at 09:38

## 2019-11-29 RX ADMIN — IBUPROFEN 600 MG: 600 TABLET ORAL at 23:36

## 2019-11-29 NOTE — PROGRESS NOTES
Hospitalist Progress Note-critical care note     Patient: Robert Benoit MRN: [id number]  CSN: [id number]    YOB: 1990  Age: 29 y.o. Sex: male    DOA: 11/28/2019 LOS:  LOS: 1 day            Chief complaint: Hypoxia, elevated creatinine, bipolar, drug abuse    Assessment/Plan         Hospital Problems  Date Reviewed: 11/28/2019          Codes Class Noted POA    Pneumonitis ICD-10-CM: J18.9  ICD-9-CM: 897  11/29/2019 Unknown        Elevated creatine kinase ICD-10-CM: R74.8  ICD-9-CM: 790.5  11/29/2019 Unknown        Bipolar 1 disorder ([facility] ICD-10-CM: F31.9  ICD-9-CM: 296.7  11/29/2019 Unknown        Hypoxia ICD-10-CM: R09.02  ICD-9-CM: 799.02  12/25/2016 Yes        * (Principal) Heroin overdose ([facility] ICD-10-CM: T40.1X1A  ICD-9-CM: 965.01, E980.0  5/18/2014 Yes              Hypoxia, due to pneumonitis and the pulmonary edema. We will continue treatment pneumonitis, DC fluid, wean off NC oxygen. Pneumonitis  We will DC clindamycin, continue Levaquin and Zosyn, vancomycin  Will narrow IV antibiotics after culture come back    Renal insufficiency MAURI, resolved will DC fluid infusion    Herion overdose   No HI/SI   Will watch withdrawal    Bipolar  On lithium we will continue check the level    Subjective , I am fine    Disposition :tbd,   Review of systems:    General: No fevers or chills. Cardiovascular: No chest pain or pressure. No palpitations. Pulmonary: shortness of breath better  Gastrointestinal: No nausea, vomiting. Vital signs/Intake and Output:  Visit Vitals  BP 91/45 (BP 1 Location: Left arm, BP Patient Position: At rest)   Pulse 62   Temp 98.2 °F (36.8 °C)   Resp 15   Ht 5' 7\" (1.702 m)   Wt 76.7 kg (169 lb)   SpO2 92%   BMI 26.47 kg/m²     Current Shift:  11/29 0701 - 11/29 1900  In: -   Out: 300 [Urine:300]  Last three shifts:  11/27 1901 - 11/29 0700  In: 1236.3 [I.V.:1236.3]  Out: -     Physical Exam:  General: WD, WN.   Alert, cooperative, no acute distress    HEENT: NC, Atraumatic. PERRLA, anicteric sclerae. Lungs: No Wheezing, +Rhonchi/Rales. Heart:  Regular  rhythm,  No murmur, No Rubs, No Gallops  Abdomen: Soft, Non distended, Non tender.  +Bowel sounds,   Extremities: No c/c/e Tattoo  noted  Psych:   Not anxious or agitated. Neurologic:  No acute neurological deficit. Labs: Results:       Chemistry Recent Labs     11/29/19  0050 11/28/19  1830   GLU 95 139*    139   K 4.1 3.6    101   CO2 27 27   BUN 11 12   CREA 0.98 1.31*   CA 7.9* 8.6   AGAP 7 11   BUCR 11* 9*   AP 75 88   TP 5.4* 6.6   ALB 3.0* 3.8   GLOB 2.4 2.8   AGRAT 1.3 1.4      CBC w/Diff Recent Labs     11/29/19  0050 11/28/19  1830   WBC 21.7* 11.1   RBC 5.14 5.78*   HGB 15.2 17.1*   HCT 45.8 51.1*    228   GRANS  --  75*   LYMPH  --  18*   EOS  --  1      Cardiac Enzymes Recent Labs     11/29/19  0629 11/29/19  0050    123   CKND1 1.3 1.5      Coagulation Recent Labs     11/28/19  1830   PTP 12.4   INR 0.9   APTT 24.3       Lipid Panel No results found for: CHOL, CHOLPOCT, CHOLX, CHLST, CHOLV, 123593, HDL, HDLP, LDL, LDLC, DLDLP, 471887, VLDLC, VLDL, TGLX, TRIGL, TRIGP, TGLPOCT, CHHD, CHHDX   BNP No results for input(s): BNPP in the last 72 hours.    Liver Enzymes Recent Labs     11/29/19  0050   TP 5.4*   ALB 3.0*   AP 75   SGOT 23      Thyroid Studies No results found for: T4, T3U, TSH, TSHEXT, TSHEXT     Procedures/imaging: see electronic medical records for all procedures/Xrays and details which were not copied into this note but were reviewed prior to creation of Marlee Banks MD

## 2019-11-29 NOTE — PROGRESS NOTES
Pharmacy Dosing Services: Vancomycin    Consult for Vancomycin Dosing by Pharmacy by [address] provided for this 29y.o. year old male , for indication of HCAP. Day of Therapy 01    Ht Readings from Last 1 Encounters:   11/28/19 170.2 cm (67\")        Wt Readings from Last 1 Encounters:   11/28/19 77.1 kg (170 lb)        Previous Regimen NA   Last Level NA   Other Current Antibiotics Zosyn 4.5 gm IV q6, Clinda 600 mg IV q8, Levaquin 750 mg IV q24   Significant Cultures Pending   Serum Creatinine Lab Results   Component Value Date/Time    Creatinine 1.31 (H) 11/28/2019 06:30 PM      Creatinine Clearance Estimated Creatinine Clearance: 78.5 mL/min (A) (based on SCr of 1.31 mg/dL (H)). BUN Lab Results   Component Value Date/Time    BUN 12 11/28/2019 06:30 PM      WBC Lab Results   Component Value Date/Time    WBC 11.1 11/28/2019 06:30 PM      H/H Lab Results   Component Value Date/Time    HGB 17.1 (H) 11/28/2019 06:30 PM      Platelets Lab Results   Component Value Date/Time    PLATELET 057 56/15/8115 06:30 PM      Temp 97.9 °F (36.6 °C)     Start Vancomycin therapy, with loading dose of 2000 mg IV x1 @~20:00 11/28/19  Follow with maintenance dose of Vancomycin 1250 mg IV q12, beginning @09:00 11/29/19    Dose calculated to approximate a therapeutic trough of 15-20 mcg/mL. Pharmacy to follow daily and will make changes to dose and/or frequency based on clinical status. Erica Alvarez Pharm. D.   Clinical Pharmacist  [phone]

## 2019-11-29 NOTE — PROGRESS NOTES
Reason for Admission:   Heroin overdose                   RRAT Score:      4               Plan for utilizing home health:      tbd                    Current Advanced Directive/Advance Care Plan:                          Transition of Care Plan:    Chart reviewed, met with pt and mother and aunt in room. Pt agreeable to CM speaking with pt in front of family. Mother states she is the one who called EMS. Pt currently staying with grandmother, plans discharge back to her home. Pt declines assistance with any inpt SA detox programs, but agreeable to CM providing resources. Pt states those programs \"don't work for [facility]". No other needs identified, CM remains available. Care Management Interventions  PCP Verified by CM:  Yes  Transition of Care Consult (CM Consult): Discharge Planning  Current Support Network: Relative's Home  Confirm Follow Up Transport: Family  Plan discussed with Pt/Family/Caregiver: Yes  Discharge Location  Discharge Placement: Home with family assistance

## 2019-11-29 NOTE — PROGRESS NOTES
conducted an initial consultation and Spiritual Assessment for Benjamin, who is a 29 y. o.,male. According to the patients chart Jehovah's witness Affiliation is: No Druze. The reason the Patient came to the hospital is:   Patient Active Problem List    Diagnosis Date Noted    Renal insufficiency 11/28/2019    Acute respiratory failure with hypoxia ([facility] 12/26/2016    Hypoxia 12/25/2016    Pneumonia 12/25/2016    Pulmonary edema 12/25/2016    Hemoptysis 12/25/2016    Heroin overdose ([facility] 05/18/2014    Encephalopathy, unspecified 05/18/2014        The  provided the following Interventions:  Initiated a relationship of care and support. Listened empathically. Provided information about Spiritual Care Services. Offered prayer and assurance of continued prayers on patients behalf. Chart reviewed. The following outcomes were achieved:  Patient shared limited information about their medical narrative, spiritual journey and beliefs. Patient processed feelings about current hospitalization. Patient expressed gratitude for Spiritual Care visit. Assessment:  There are no significant spiritual or Congregational issues which require further intervention at this time. Patient does not have any Congregational or cultural needs that will affect patients preferences in health care. Plan:  Chaplains will continue to follow and will provide pastoral care as needed or requested.  recommends bedside caregivers page  on duty if patient shows signs of acute spiritual or emotional distress. [address] [place] M.Div.   [address]  [phone] - Office

## 2019-11-29 NOTE — ED NOTES
CT unable to perform scan at this time due to pt large amount of emesis. Pt transported back to ED. Medicated per MAR.

## 2019-11-29 NOTE — ED NOTES
TRANSFER - OUT REPORT:    Verbal report given to Veronica on Benjamin  being transferred to [facility] for routine progression of care       Report consisted of patients Situation, Background, Assessment and   Recommendations(SBAR). Information from the following report(s) SBAR, ED Summary, [facility] and Recent Results was reviewed with the receiving nurse. Lines:   Peripheral IV 11/28/19 Right Antecubital (Active)   Site Assessment Clean, dry, & intact 11/28/2019  6:24 PM   Phlebitis Assessment 0 11/28/2019  6:24 PM   Infiltration Assessment 0 11/28/2019  6:24 PM   Dressing Status Clean, dry, & intact 11/28/2019  6:24 PM   Dressing Type Transparent 11/28/2019  6:24 PM   Hub Color/Line Status Green;Patent; Flushed 11/28/2019  6:24 PM       Peripheral IV 11/28/19 Right Arm (Active)   Site Assessment Clean, dry, & intact 11/28/2019  9:04 PM   Phlebitis Assessment 0 11/28/2019  9:04 PM   Infiltration Assessment 0 11/28/2019  9:04 PM   Dressing Status Clean, dry, & intact 11/28/2019  9:04 PM   Dressing Type 4 X 4 11/28/2019  9:04 PM   Hub Color/Line Status Patent; Flushed 11/28/2019  9:04 PM        Opportunity for questions and clarification was provided.       Patient transported with:   Monitor  O2 @ 4 liters  Tech

## 2019-11-29 NOTE — H&P
History & Physical    Patient: Desiree Mcqueen MRN: [id number]  CSN: [id number]    YOB: 1990  Age: 29 y.o. Sex: male      DOA: 11/28/2019  Primary Care Provider:  None      Assessment/Plan     Patient Active Problem List   Diagnosis Code    Heroin overdose ([facility] T40.1X1A    Encephalopathy, unspecified G93.40    Hypoxia R09.02    Pneumonia J18.9    Pulmonary edema J81.1    Hemoptysis R04.2    Acute respiratory failure with hypoxia (HCC) J96.01    Renal insufficiency N28.9     28 y/o male w/ hx of heroin abuse, bipolar disorder on lithium, and hepatitis C is admitted for heroin overdose and hypoxia s/p administration of narcan. Hypoxia due to mild pulmonary edema, pneumonitis, as well as possible aspiration event. Doubt sepsis given normal initial lactate but will continue broad spectrum coverage for now until HIV lab is back.    -telemetry  -continuous pulse ox  -consider lasix as BP tolerates  -wean O2 to NC  -gentle IV hydration given pulm edema   -coverage w/ broad spectrum abx for aspiration (zosyn/vanc/levaquin/clinda)  -bld cx pending  -f/u urine drug screen (refused urinary cath)    Law enforcement at bedside. Prophylaxis: SCDs, pepcid IV, heparin SQ    Estimated length of stay : 2 nights    MD Shell Espinosaist  --------------------------------------------------------------------------------------------------------------------------------------------------------------------------------------------------------------------  CC: respiratory depression       HPI:     Desiree Mcqueen is a 29 y.o. male who has a hx of bipolar disorder on lithium, heroin abuse, and hepatitis C presents by EMS after family members called when his RR was 4 at home. Suspected heroin overdose and given narcan in the field and ER. Initial sats 88% on RA so was placed on NRB. Law enforcement at bedside and pt is not very forthcoming with history. Denies SI/HI.     Past Medical History:   Diagnosis Date    Anxiety     Bipolar I disorder, current or most recent episode depressed, with psychotic features with anxious distress (HCC)     Hepatitis B     Hepatitis C     Hepatitis C, chronic (HCC)     [facility] ([facility]     HIV (human immunodeficiency virus infection) ([facility]     Infectious disease     Psychiatric disorder     Schizoaffective disorder, depressive type with good prognostic features ([facility]        Past Surgical History:   Procedure Laterality Date    HX ANKLE FRACTURE TX         History reviewed. No pertinent family history. Social History     Socioeconomic History    Marital status: SINGLE     Spouse name: Not on file    Number of children: Not on file    Years of education: Not on file    Highest education level: Not on file   Tobacco Use    Smoking status: Current Every Day Smoker     Packs/day: 1.50    Smokeless tobacco: Never Used   Substance and Sexual Activity    Alcohol use: Yes     Alcohol/week: 2.0 standard drinks     Types: 2 Cans of beer per week    Drug use: Yes     Types: Heroin, Cocaine   Social History Narrative    ** Merged History Encounter **            Prior to Admission medications    Medication Sig Start Date End Date Taking? Authorizing Provider   OLANZapine (ZYPREXA) 15 mg tablet Take 15 mg by mouth nightly. OtherNarda MD   lithium carbonate 300 mg capsule Take  by mouth three (3) times daily (with meals). Narda Keene MD   hydrOXYzine pamoate (VISTARIL) 100 mg capsule Take 100 mg by mouth three (3) times daily as needed for Itching. Narda Keene MD   levothyroxine (SYNTHROID) 75 mcg tablet Take  by mouth Daily (before breakfast). Indications: hypothyroidism    Narda Keene MD   hydrOXYzine HCl (ATARAX) 50 mg tablet Take 1 Tab by mouth every six (6) hours as needed for Itching.  8/14/19   Ángel Griffith PA   albuterol (PROVENTIL HFA, VENTOLIN HFA, PROAIR HFA) 90 mcg/actuation inhaler Take 2 Puffs by inhalation every four (4) hours as needed for Wheezing. 16   Debbi Caba MD   ALPRAZOLAM (XANAX PO) Take 1 mg by mouth. Other, MD Narda       Allergies   Allergen Reactions    Tylenol [Acetaminophen] Other (comments)     Pt reports has Hep C cant take tylenol          Review of Systems  Gen: No fever, chills, malaise, weight loss/gain. Heent: No headache, rhinorrhea, epistaxis, ear pain, hearing loss, sinus pain, neck pain/stiffness, sore throat. Heart: +chest pain, no palpitations, GARCIA, pnd, or orthopnea. Resp: +shortness of breath. GI: No nausea, vomiting, diarrhea, constipation, melena or hematochezia. : No urinary obstruction, dysuria or hematuria. Derm: No rash, new skin lesion or pruritis. Musc/skeletal: no bone or joint complaints. Vasc: No edema, cyanosis or claudication. Endo: No heat/cold intolerance, no polyuria,polydipsia or polyphagia. Neuro: No unilateral weakness, numbness, tingling. No seizures. Heme: No easy bruising or bleeding. Physical Exam:     Physical Exam:  Visit Vitals  /59 (BP 1 Location: Left arm, BP Patient Position: At rest)   Pulse 93   Temp 98.4 °F (36.9 °C)   Resp 20   Ht 5' 7\" (1.702 m)   Wt 77.1 kg (170 lb)   SpO2 100%   BMI 26.63 kg/m²    O2 Flow Rate (L/min): 4 l/min O2 Device: Nasal cannula    Temp (24hrs), Av.2 °F (36.8 °C), Min:97.9 °F (36.6 °C), Max:98.4 °F (36.9 °C)    No intake/output data recorded. No intake/output data recorded. General:  Awake, cooperative, no distress. Head:  Normocephalic, without obvious abnormality, atraumatic. Eyes:  Conjunctivae/corneas clear, sclera anicteric. Neck: Supple, symmetrical, trachea midline. Lungs:   Rales in LLL base. Heart:  Regular rate and rhythm, S1, S2 normal, no murmur, click, rub or gallop. Abdomen: Soft, non-tender. Bowel sounds normal. No masses,  No organomegaly. Extremities: Extremities normal, atraumatic, no cyanosis or edema.  Capillary refill normal.   Pulses: 2+ and symmetric all extremities. Skin: Skin color pink, turgor normal. No rashes or lesions   Neurologic: No focal motor or sensory deficit. Psych: depressed affect, poor eye contact       Labs Reviewed: All lab results for the last 24 hours reviewed. Recent Results (from the past 24 hour(s))   ACETAMINOPHEN    Collection Time: 11/28/19  6:20 PM   Result Value Ref Range    Acetaminophen level <2 (L) 10.0 - 78.4 ug/mL   SALICYLATE    Collection Time: 11/28/19  6:20 PM   Result Value Ref Range    Salicylate level <7.3 (L) 2.8 - 20.0 MG/DL   EKG, 12 LEAD, INITIAL    Collection Time: 11/28/19  6:25 PM   Result Value Ref Range    Ventricular Rate 89 BPM    Atrial Rate 89 BPM    P-R Interval 170 ms    QRS Duration 82 ms    Q-T Interval 368 ms    QTC Calculation (Bezet) 447 ms    Calculated P Axis 58 degrees    Calculated R Axis 69 degrees    Calculated T Axis 52 degrees    Diagnosis       Normal sinus rhythm  Left atrial enlargement  Borderline ECG  When compared with ECG of 22-SEP-2017 22:13,  No significant change was found     CBC WITH AUTOMATED DIFF    Collection Time: 11/28/19  6:30 PM   Result Value Ref Range    WBC 11.1 4.6 - 13.2 K/uL    RBC 5.78 (H) 4.70 - 5.50 M/uL    HGB 17.1 (H) 13.0 - 16.0 g/dL    HCT 51.1 (H) 36.0 - 48.0 %    MCV 88.4 74.0 - 97.0 FL    MCH 29.6 24.0 - 34.0 PG    MCHC 33.5 31.0 - 37.0 g/dL    RDW 14.7 (H) 11.6 - 14.5 %    PLATELET 539 286 - 913 K/uL    MPV 9.4 9.2 - 11.8 FL    NEUTROPHILS 75 (H) 40 - 73 %    LYMPHOCYTES 18 (L) 21 - 52 %    MONOCYTES 6 3 - 10 %    EOSINOPHILS 1 0 - 5 %    BASOPHILS 0 0 - 2 %    ABS. NEUTROPHILS 8.3 (H) 1.8 - 8.0 K/UL    ABS. LYMPHOCYTES 2.0 0.9 - 3.6 K/UL    ABS. MONOCYTES 0.7 0.05 - 1.2 K/UL    ABS. EOSINOPHILS 0.1 0.0 - 0.4 K/UL    ABS.  BASOPHILS 0.0 0.0 - 0.1 K/UL    DF AUTOMATED     CARDIAC PANEL,(CK, CKMB & TROPONIN)    Collection Time: 11/28/19  6:30 PM   Result Value Ref Range     39 - 308 U/L    CK - MB 2.3 <3.6 ng/ml    CK-MB Index 1.4 0.0 - 4.0 % Troponin-I, QT <0.02 0.0 - 0.045 NG/ML   NT-PRO BNP    Collection Time: 11/28/19  6:30 PM   Result Value Ref Range    NT pro-BNP 32 0 - 450 PG/ML   D DIMER    Collection Time: 11/28/19  6:30 PM   Result Value Ref Range    D DIMER 3.61 (H) <0.46 ug/ml(FEU)   ETHYL ALCOHOL    Collection Time: 11/28/19  6:30 PM   Result Value Ref Range    ALCOHOL(ETHYL),SERUM <3 0 - 3 MG/DL   METABOLIC PANEL, COMPREHENSIVE    Collection Time: 11/28/19  6:30 PM   Result Value Ref Range    Sodium 139 136 - 145 mmol/L    Potassium 3.6 3.5 - 5.5 mmol/L    Chloride 101 100 - 111 mmol/L    CO2 27 21 - 32 mmol/L    Anion gap 11 3.0 - 18 mmol/L    Glucose 139 (H) 74 - 99 mg/dL    BUN 12 7.0 - 18 MG/DL    Creatinine 1.31 (H) 0.6 - 1.3 MG/DL    BUN/Creatinine ratio 9 (L) 12 - 20      GFR est AA >60 >60 ml/min/1.73m2    GFR est non-AA >60 >60 ml/min/1.73m2    Calcium 8.6 8.5 - 10.1 MG/DL    Bilirubin, total 0.5 0.2 - 1.0 MG/DL    ALT (SGPT) 51 16 - 61 U/L    AST (SGOT) 38 10 - 38 U/L    Alk. phosphatase 88 45 - 117 U/L    Protein, total 6.6 6.4 - 8.2 g/dL    Albumin 3.8 3.4 - 5.0 g/dL    Globulin 2.8 2.0 - 4.0 g/dL    A-G Ratio 1.4 0.8 - 1.7     PROTHROMBIN TIME + INR    Collection Time: 11/28/19  6:30 PM   Result Value Ref Range    Prothrombin time 12.4 11.5 - 15.2 sec    INR 0.9 0.8 - 1.2     PTT    Collection Time: 11/28/19  6:30 PM   Result Value Ref Range    aPTT 24.3 23.0 - 36.4 SEC   POC G3    Collection Time: 11/28/19  6:49 PM   Result Value Ref Range    Device: Non rebreather      Flow rate (POC) 15 L/M    FIO2 (POC) 100 %    pH (POC) 7.404 7.35 - 7.45      pCO2 (POC) 39.8 35.0 - 45.0 MMHG    pO2 (POC) 88 80 - 100 MMHG    HCO3 (POC) 24.9 22 - 26 MMOL/L    sO2 (POC) 97 92 - 97 %    Base excess (POC) 0 mmol/L    Allens test (POC) YES      Total resp.  rate 17      Site LEFT BRACHIAL      Specimen type (POC) ARTERIAL      Performed by Lori Kimble        Results   XR CHEST PORT (Accession [id number]) (Order [id number])   Allergies  Not Specified: Tylenol [Acetaminophen]   Exam Information     Status Exam Begun  Exam Ended    Final [99] 11/28/2019 18:42 11/28/2019 18:52   Result Information     Status: Final result (Exam End: 11/28/2019 18:52) Provider Status: Open   Study Result     EXAM: AP portable upright chest     INDICATION: Overdose, aspiration     COMPARISON: December 26, 2016     _______________     FINDINGS:     Cardiac thymic silhouette is normal. There are bilateral increased interstitial  lung markings which may represent aspiration/pneumonitis or pulmonary edema. There are no effusions or pneumothorax.     _______________     IMPRESSION  IMPRESSION:     Bilateral increased perihilar and lower lobe interstitial lung markings  suggesting possible pulmonary edema/pneumonitis/aspiration. No effusions or  pneumothorax seen.        Results   CTA CHEST W OR W WO CONT (Accession [id number]) (Order [id number])   Allergies      Not Specified: Tylenol [Acetaminophen]   Exam Information     Status Exam Begun  Exam Ended    Final [99] 11/28/2019 20:39 11/28/2019 20:45   Result Information     Status: Final result (Exam End: 11/28/2019 20:45) Provider Status: Open   Study Result     EXAM: CTA chest     INDICATION: Chest pain     COMPARISON: December 5, 2011     TECHNIQUE: Axial CT imaging from the thoracic inlet through the diaphragm with  intravenous contrast. Coronal and sagittal MIP reformats were generated. One or  more dose reduction techniques were used on this CT: automated exposure control,  adjustment of the mAs and/or kVp according to patient size, and iterative  reconstruction techniques. The specific techniques used on this CT exam have  been documented in the patient's electronic medical record.  Digital Imaging and  Communications in Medicine (DICOM) format image data are available to  nonaffiliated external healthcare facilities or entities on a secure, media  free, reciprocally searchable basis with patient authorization for at least a  12-month period after this study.     _______________     FINDINGS:     EXAM QUALITY: Overall exam quality is satisfactory. Pulmonary arterial  enhancement is adequate with suboptimal breath hold and there is moderate  breathing motion artifact.     PULMONARY ARTERIES: No evidence of pulmonary embolism.     LYMPH Nodes: No enlarged lymph nodes seen.     PLEURA: No pleural effusion seen.     HEART: Normal without pericardial effusion.     VASCULATURE/MEDIASTINUM: Small hiatal hernia present. Is no aortic dissection.     LUNGS: There are diffuse bilateral interstitial and alveolar infiltrates  especially along the posterior aspect of all the lobes suggesting pulmonary  edema/pneumonia and/or aspiration.     AIRWAY: Patent     UPPER ABDOMEN: Unremarkable.     OTHER: No acute or aggressive osseous abnormalities identified.     _______________     IMPRESSION  IMPRESSION:     No evidence of a pulmonary embolism or aortic dissection.     Interstitial and alveolar infiltrates in both lungs diffusely especially along  the posterior aspect of both lower lobes. May represent pneumonia/aspiration  and/or pulmonary edema.

## 2019-11-29 NOTE — ROUTINE PROCESS
Assume care of patient from [address] [place] [address], RN(ED). Patient received in bed with eyes closed, pt drowsy, opens eyes to voice. Patient is orientated to person, time but not place or situation, denies pain and discomfort. No distress noted. Patient on 4L via NC. SaO2 at 94%. Officer at patient's bedside on arrival to room 334. Family members present at bedside. Bed locked in low position. Call bell within reach and patient verbalized understanding of use for assistance and needs. 2143- Vitals obtained. Noted MEWS of 4 due to RR and responsiveness, pt in no distress at this time, called and informed Dr. Gerardo Harris. Stated to continue to monitor patient. Patient refused oral medications, Lithium and Synthroid this time. Patient nodded to still having some nausea but denied any Zofran. Communicated to MD, stated okay, will continue to monitor. 3427- Bedside and Verbal shift change report given to Matthew Green RN (oncoming nurse) by Walter Centeno RN (offgoing nurse). Report included the following information SBAR, Kardex, Intake/Output, MAR and Recent Results.

## 2019-11-29 NOTE — PROGRESS NOTES
Problem: Pain  Goal: *Control of Pain  Outcome: Progressing Towards Goal  Goal: *PALLIATIVE CARE:  Alleviation of Pain  Outcome: Progressing Towards Goal     Problem: Patient Education: Go to Patient Education Activity  Goal: Patient/Family Education  Outcome: Progressing Towards Goal     Problem: Falls - Risk of  Goal: *Absence of Falls  Description  Document [place] Reas Fall Risk and appropriate interventions in the flowsheet. Outcome: Progressing Towards Goal  Note: Fall Risk Interventions:                      History of Falls Interventions: Bed/chair exit alarm, Door open when patient unattended, Investigate reason for fall         Problem: Patient Education: Go to Patient Education Activity  Goal: Patient/Family Education  Outcome: Progressing Towards Goal     Problem: Pressure Injury - Risk of  Goal: *Prevention of pressure injury  Description  Document Dickson Scale and appropriate interventions in the flowsheet.   Outcome: Progressing Towards Goal  Note: Pressure Injury Interventions:                                            Problem: Patient Education: Go to Patient Education Activity  Goal: Patient/Family Education  Outcome: Progressing Towards Goal     Problem: Opiate Withdrawal  Goal: *STG: Participates in treatment plan  Outcome: Progressing Towards Goal  Goal: *STG: Remains safe in hospital  Outcome: Progressing Towards Goal  Goal: *STG: Seeks staff when symptoms of withdrawal increase  Outcome: Progressing Towards Goal  Goal: *STG: Complies with medication therapy  Outcome: Progressing Towards Goal  Goal: *STG: Attends activities and groups  Outcome: Progressing Towards Goal  Goal: *STG: Will identify negative impact of chemical dependency including the use of tobacco, alcohol, and other substances  Outcome: Progressing Towards Goal  Goal: *STG: Verbalizes abstinence as an achievable goal  Outcome: Progressing Towards Goal  Goal: *STG: Agrees to participate in outpatient after care program to support ongoing mental health  Outcome: Progressing Towards Goal  Goal: *STG: Able to indentify relapse triggers including interpersonal/social and familial factors  Outcome: Progressing Towards Goal  Goal: *STG: Identify lifestyle changes to support long term sobriety such as vocation, employment, education, and legal issues  Outcome: Progressing Towards Goal  Goal: *STG: Maintains appropriate nutrition and hydration  Outcome: Progressing Towards Goal  Goal: *STG: Vital signs within defined limits  Outcome: Progressing Towards Goal  Goal: *STG/LTG: Relapse prevention plan in place to include housing/aftercare, leisure activities, and spirituality  Outcome: Progressing Towards Goal  Goal: Interventions  Outcome: Progressing Towards Goal     Problem: Patient Education: Go to Patient Education Activity  Goal: Patient/Family Education  Outcome: Progressing Towards Goal

## 2019-11-29 NOTE — ED NOTES
Pt re-breather off while vomiting, observed SpO2 drop to 86% on RA. Pt placed back on non-rebreather.

## 2019-11-29 NOTE — PROGRESS NOTES
0715 received bedside report from night shift RN. Patient resting quietly in bed, no complaint of pain/nausea. Alert and oriented x 4, calm and cooperative. Urine sample sent to lab    Patient resting quietly in bed throughout shift, watching television. Patient put on regular diet, tolerating well, ate all of meal.     Patient Vitals for the past 12 hrs:   Temp Pulse Resp BP SpO2   11/29/19 1512 98.2 °F (36.8 °C) 65 16 112/62 98 %   11/29/19 1121 98.2 °F (36.8 °C) 62 15 91/45 92 %   11/29/19 0731 98.4 °F (36.9 °C) 71 16 103/60 94 %     1915 Bedside and Verbal shift change report given to Beverly Keith RN (oncoming nurse) by Mily Cassidy RN (offgoing nurse). Report included the following information SBAR, Kardex, Intake/Output, MAR and Recent Results.

## 2019-11-30 LAB
ALBUMIN SERPL-MCNC: 3.1 G/DL (ref 3.4–5)
ALBUMIN/GLOB SERPL: 1.3 {RATIO} (ref 0.8–1.7)
ALP SERPL-CCNC: 68 U/L (ref 45–117)
ALT SERPL-CCNC: 33 U/L (ref 16–61)
ANION GAP SERPL CALC-SCNC: 5 MMOL/L (ref 3–18)
AST SERPL-CCNC: 20 U/L (ref 10–38)
BILIRUB SERPL-MCNC: 0.9 MG/DL (ref 0.2–1)
BUN SERPL-MCNC: 7 MG/DL (ref 7–18)
BUN/CREAT SERPL: 7 (ref 12–20)
CALCIUM SERPL-MCNC: 8.5 MG/DL (ref 8.5–10.1)
CHLORIDE SERPL-SCNC: 106 MMOL/L (ref 100–111)
CO2 SERPL-SCNC: 29 MMOL/L (ref 21–32)
CREAT SERPL-MCNC: 1 MG/DL (ref 0.6–1.3)
ERYTHROCYTE [DISTWIDTH] IN BLOOD BY AUTOMATED COUNT: 14.6 % (ref 11.6–14.5)
GLOBULIN SER CALC-MCNC: 2.3 G/DL (ref 2–4)
GLUCOSE SERPL-MCNC: 85 MG/DL (ref 74–99)
HCT VFR BLD AUTO: 46.3 % (ref 36–48)
HGB BLD-MCNC: 14.9 G/DL (ref 13–16)
MCH RBC QN AUTO: 29 PG (ref 24–34)
MCHC RBC AUTO-ENTMCNC: 32.2 G/DL (ref 31–37)
MCV RBC AUTO: 90.1 FL (ref 74–97)
PLATELET # BLD AUTO: 212 K/UL (ref 135–420)
PMV BLD AUTO: 9.7 FL (ref 9.2–11.8)
POTASSIUM SERPL-SCNC: 3.9 MMOL/L (ref 3.5–5.5)
PROT SERPL-MCNC: 5.4 G/DL (ref 6.4–8.2)
RBC # BLD AUTO: 5.14 M/UL (ref 4.7–5.5)
SODIUM SERPL-SCNC: 140 MMOL/L (ref 136–145)
VANCOMYCIN TROUGH SERPL-MCNC: 12.5 UG/ML (ref 10–20)
WBC # BLD AUTO: 13.2 K/UL (ref 4.6–13.2)

## 2019-11-30 PROCEDURE — 74011250636 HC RX REV CODE- 250/636: Performed by: PHYSICIAN ASSISTANT

## 2019-11-30 PROCEDURE — 74011250637 HC RX REV CODE- 250/637: Performed by: FAMILY MEDICINE

## 2019-11-30 PROCEDURE — 74011250636 HC RX REV CODE- 250/636: Performed by: FAMILY MEDICINE

## 2019-11-30 PROCEDURE — 80053 COMPREHEN METABOLIC PANEL: CPT

## 2019-11-30 PROCEDURE — 80202 ASSAY OF VANCOMYCIN: CPT

## 2019-11-30 PROCEDURE — 65270000029 HC RM PRIVATE

## 2019-11-30 PROCEDURE — 74011000250 HC RX REV CODE- 250: Performed by: FAMILY MEDICINE

## 2019-11-30 PROCEDURE — 85027 COMPLETE CBC AUTOMATED: CPT

## 2019-11-30 PROCEDURE — 36415 COLL VENOUS BLD VENIPUNCTURE: CPT

## 2019-11-30 PROCEDURE — 77010033678 HC OXYGEN DAILY

## 2019-11-30 PROCEDURE — 74011250637 HC RX REV CODE- 250/637: Performed by: INTERNAL MEDICINE

## 2019-11-30 RX ORDER — TRAZODONE HYDROCHLORIDE 50 MG/1
50 TABLET ORAL
Status: DISCONTINUED | OUTPATIENT
Start: 2019-11-30 | End: 2019-11-30

## 2019-11-30 RX ORDER — CHOLECALCIFEROL (VITAMIN D3) 125 MCG
10 CAPSULE ORAL
Status: DISCONTINUED | OUTPATIENT
Start: 2019-12-01 | End: 2019-12-01

## 2019-11-30 RX ORDER — IBUPROFEN 600 MG/1
600 TABLET ORAL
Status: DISCONTINUED | OUTPATIENT
Start: 2019-11-30 | End: 2019-12-02 | Stop reason: HOSPADM

## 2019-11-30 RX ADMIN — LITHIUM CARBONATE 300 MG: 300 CAPSULE, GELATIN COATED ORAL at 09:56

## 2019-11-30 RX ADMIN — FAMOTIDINE 20 MG: 10 INJECTION, SOLUTION INTRAVENOUS at 22:26

## 2019-11-30 RX ADMIN — VANCOMYCIN HYDROCHLORIDE 1250 MG: 1.25 INJECTION, POWDER, LYOPHILIZED, FOR SOLUTION INTRAVENOUS at 09:55

## 2019-11-30 RX ADMIN — LEVOFLOXACIN 750 MG: 5 INJECTION, SOLUTION INTRAVENOUS at 19:47

## 2019-11-30 RX ADMIN — LEVOTHYROXINE SODIUM 75 MCG: 75 TABLET ORAL at 07:05

## 2019-11-30 RX ADMIN — LITHIUM CARBONATE 300 MG: 300 CAPSULE, GELATIN COATED ORAL at 18:00

## 2019-11-30 RX ADMIN — LITHIUM CARBONATE 300 MG: 300 CAPSULE, GELATIN COATED ORAL at 22:26

## 2019-11-30 RX ADMIN — VANCOMYCIN HYDROCHLORIDE 1500 MG: 1.5 INJECTION, POWDER, LYOPHILIZED, FOR SOLUTION INTRAVENOUS at 22:35

## 2019-11-30 RX ADMIN — FAMOTIDINE 20 MG: 10 INJECTION, SOLUTION INTRAVENOUS at 09:56

## 2019-11-30 RX ADMIN — IBUPROFEN 600 MG: 600 TABLET ORAL at 10:34

## 2019-11-30 NOTE — PROGRESS NOTES
1930 Assumed care from Vishal Dennis RN.    9897 Made Dr. Marian Mahmood aware of the pt requesting pain relief. 4486 TRANSFER - OUT REPORT:    Verbal report given to Rossi Power RN(name) on [place]  being transferred to [facility](unit) for routine progression of care       Report consisted of patients Situation, Background, Assessment and   Recommendations(SBAR). Information from the following report(s) SBAR, Kardex, ED Summary, Intake/Output, MAR, Recent Results, Med Rec Status and Cardiac Rhythm NSR was reviewed with the receiving nurse. Lines:   Peripheral IV 11/28/19 Right Antecubital (Active)   Site Assessment Clean, dry, & intact 11/30/2019  1:35 AM   Phlebitis Assessment 0 11/30/2019  1:35 AM   Infiltration Assessment 0 11/30/2019  1:35 AM   Dressing Status Clean, dry, & intact 11/30/2019  1:35 AM   Dressing Type Tape 11/30/2019  1:35 AM   Hub Color/Line Status Capped 11/30/2019  1:35 AM   Action Taken Open ports on tubing capped 11/29/2019  8:52 AM   Alcohol Cap Used Yes 11/30/2019  1:35 AM       Peripheral IV 11/28/19 Right Arm (Active)   Site Assessment Clean, dry, & intact 11/30/2019  1:35 AM   Phlebitis Assessment 0 11/30/2019  1:35 AM   Infiltration Assessment 0 11/30/2019  1:35 AM   Dressing Status Clean, dry, & intact 11/30/2019  1:35 AM   Dressing Type Tape;Transparent 11/30/2019  1:35 AM   Hub Color/Line Status Patent; Infusing 11/30/2019  1:35 AM   Action Taken Open ports on tubing capped 11/29/2019  8:52 AM   Alcohol Cap Used Yes 11/30/2019  1:35 AM        Opportunity for questions and clarification was provided.       Patient transported with:   Monitor  O2 @ 4 liters  Registered Nurse

## 2019-11-30 NOTE — PROGRESS NOTES
Problem: Pain  Goal: *Control of Pain  Outcome: Progressing Towards Goal  Goal: *PALLIATIVE CARE:  Alleviation of Pain  Outcome: Progressing Towards Goal     Problem: Falls - Risk of  Goal: *Absence of Falls  Description  Document Zackary Beltran Fall Risk and appropriate interventions in the flowsheet. Outcome: Progressing Towards Goal  Note: Fall Risk Interventions:            Medication Interventions: Evaluate medications/consider consulting pharmacy, Bed/chair exit alarm, Patient to call before getting OOB, Teach patient to arise slowly         History of Falls Interventions:  Investigate reason for fall         Problem: Opiate Withdrawal  Goal: *STG: Participates in treatment plan  Outcome: Progressing Towards Goal  Goal: *STG: Remains safe in hospital  Outcome: Progressing Towards Goal  Goal: *STG: Seeks staff when symptoms of withdrawal increase  Outcome: Progressing Towards Goal  Goal: *STG: Complies with medication therapy  Outcome: Progressing Towards Goal  Goal: *STG: Attends activities and groups  Outcome: Progressing Towards Goal  Goal: *STG: Will identify negative impact of chemical dependency including the use of tobacco, alcohol, and other substances  Outcome: Progressing Towards Goal  Goal: *STG: Verbalizes abstinence as an achievable goal  Outcome: Progressing Towards Goal  Goal: *STG: Agrees to participate in outpatient after care program to support ongoing mental health  Outcome: Progressing Towards Goal  Goal: *STG: Able to indentify relapse triggers including interpersonal/social and familial factors  Outcome: Progressing Towards Goal  Goal: *STG: Identify lifestyle changes to support long term sobriety such as vocation, employment, education, and legal issues  Outcome: Progressing Towards Goal  Goal: *STG: Maintains appropriate nutrition and hydration  Outcome: Progressing Towards Goal  Goal: *STG: Vital signs within defined limits  Outcome: Progressing Towards Goal  Goal: *STG/LTG: Relapse prevention plan in place to include housing/aftercare, leisure activities, and spirituality  Outcome: Progressing Towards Goal  Goal: Interventions  Outcome: Progressing Towards Goal     Problem: Pneumonia: Day 1  Goal: Off Pathway (Use only if patient is Off Pathway)  Outcome: Progressing Towards Goal  Goal: Activity/Safety  Outcome: Progressing Towards Goal  Goal: Consults, if ordered  Outcome: Progressing Towards Goal  Goal: Diagnostic Test/Procedures  Outcome: Progressing Towards Goal  Goal: Nutrition/Diet  Outcome: Progressing Towards Goal  Goal: Medications  Outcome: Progressing Towards Goal  Goal: Respiratory  Outcome: Progressing Towards Goal  Goal: Treatments/Interventions/Procedures  Outcome: Progressing Towards Goal  Goal: Psychosocial  Outcome: Progressing Towards Goal  Goal: *Oxygen saturation within defined limits  Outcome: Progressing Towards Goal  Goal: *Influenza vaccine administered (October-March)  Outcome: Progressing Towards Goal  Goal: *Pneumoccocal vaccine administered  Outcome: Progressing Towards Goal  Goal: *Hemodynamically stable  Outcome: Progressing Towards Goal  Goal: *Demonstrates progressive activity  Outcome: Progressing Towards Goal  Goal: *Tolerating diet  Outcome: Progressing Towards Goal

## 2019-11-30 NOTE — PROGRESS NOTES
Hospitalist Progress Note    Patient: Maicol Corona MRN: [id number]  CSN: [id number]    YOB: 1990  Age: 29 y.o. Sex: male    DOA: 11/28/2019 LOS:  LOS: 2 days                Assessment and Plan:    Principal Problem:    Pneumonitis (11/29/2019)    Active Problems:    Heroin overdose ([facility] [address] (5/18/2014)      Hypoxia (12/25/2016)      Elevated creatine kinase (11/29/2019)      Bipolar 1 disorder ([facility] [address] (11/29/2019)        Hypoxia, due to pneumonitis and the pulmonary edema. We will continue treatment pneumonitis, DC fluid, wean off NC oxygen.     Pneumonitis  Off clindamycin, continue Levaquin and Zosyn, vancomycin  Will narrow IV antibiotics after blood culture come back     Renal insufficiency MAURI, resolved and remains ok off of IVFluids     Heroin overdose   No HI/SI   Will watch withdrawal     Bipolar  On lithium /slightly low      Chief complaint:  Hypoxia, elevated creatinine, bipolar, drug abuse      Subjective:      Feels tired  Overall a little better    Review of systems:    General: No fevers or chills. Cardiovascular: No chest pain or pressure. No palpitations. Pulmonary: No shortness of breath. Gastrointestinal: No nausea, vomiting. Objective:    Vital signs/Intake and Output:  Visit Vitals  /63   Pulse 65   Temp 98.2 °F (36.8 °C)   Resp 18   Ht 5' 7\" (1.702 m)   Wt 77.2 kg (170 lb 3.1 oz)   SpO2 100%   BMI 26.66 kg/m²     Current Shift:  11/30 0701 - 11/30 1900  In: -   Out: 218 [Urine:675]  Last three shifts:  11/28 1901 - 11/30 0700  In: 2436.3 [I.V.:2436.3]  Out: 3021 [Urine:3375]    Physical Exam:  General: NAD, AAOx3. Non-toxic. HEENT: NC/AT. PERRLA, EOMI.  MMM. Lungs: Nml inspection. CTA B/L. No wheezing, rales or rhonchi. Heart:  S1S2 RRR,  PMI mid 5th IC space. No M/RG. Abdomen: Soft, NT/ND.  BS+. No peritoneal signs. Extremities: No C/C/E. Psych:   Nml affect. Neurologic:  2-12 intact. Strength 5/5 throughout.   Sensation symmetrical.          Labs: Results:       Chemistry Recent Labs     11/30/19  0345 11/29/19  0050 11/28/19  1830   GLU 85 95 139*    140 139   K 3.9 4.1 3.6    106 101   CO2 29 27 27   BUN 7 11 12   CREA 1.00 0.98 1.31*   CA 8.5 7.9* 8.6   AGAP 5 7 11   BUCR 7* 11* 9*   AP 68 75 88   TP 5.4* 5.4* 6.6   ALB 3.1* 3.0* 3.8   GLOB 2.3 2.4 2.8   AGRAT 1.3 1.3 1.4      CBC w/Diff Recent Labs     11/30/19  0345 11/29/19  0050 11/28/19  1830   WBC 13.2 21.7* 11.1   RBC 5.14 5.14 5.78*   HGB 14.9 15.2 17.1*   HCT 46.3 45.8 51.1*    202 228   GRANS  --   --  75*   LYMPH  --   --  18*   EOS  --   --  1      Cardiac Enzymes Recent Labs     11/29/19  1320 11/29/19  0629    109   CKND1 1.2 1.3      Coagulation Recent Labs     11/28/19  1830   PTP 12.4   INR 0.9   APTT 24.3       Lipid Panel No results found for: CHOL, CHOLPOCT, CHOLX, CHLST, CHOLV, 967345, HDL, HDLP, LDL, LDLC, DLDLP, 700863, VLDLC, VLDL, TGLX, TRIGL, TRIGP, TGLPOCT, CHHD, CHHDX   BNP No results for input(s): BNPP in the last 72 hours.    Liver Enzymes Recent Labs     11/30/19  0345   TP 5.4*   ALB 3.1*   AP 68   SGOT 20      Thyroid Studies No results found for: T4, T3U, TSH, TSHEXT     Procedures/imaging: see electronic medical records for all procedures/Xrays and details which were not copied into this note but were reviewed prior to creation of Plan

## 2019-11-30 NOTE — PROGRESS NOTES
1024 Pt c/o chest wall pain, coughing during assessment. Pt requests motrin. No active orders at this time though motrin was given last night. Informed Dr. Yanet Presley. Orders received for motrin 600mg q8h prn PO. Also asked if pt should be dosed for clonidine per COWS protocol. Per Dr. Yanet Presley, only monitor at this time. 1200 Both of pt's IV sites infiltrated. Attempted x2 to establish new site. Unsuccessful. ICU was asked to reattempt access. Vancomycin infusion paused. 1344 Failed to establish IV access. Will continue to attempt. 1433 Attempts thus far unsuccessful. Informed Dr. Yanet Presley and asked if site may be in lower extremity. Order received. 1600 New access 20 g in right AC established. Restarted Vancomycin infusion. SHIFT SUMMARY: No acute changes during this shift. Medicated for pain with ibuprofen. COWS score = 2 at highest for this shift. Recommended to oncoming nurse to monitor as it will be day 3 of admission.

## 2019-11-30 NOTE — PROGRESS NOTES
Shift summary: Pt slept majority of shift. NAD, VSS. O2 sats remained > 90% on 4L via nasal cannula. Bedside and Verbal shift change report given to CLARICE Carrasco (oncoming nurse) by Opal Canavan, RN (offgoing nurse). Report included the following information SBAR, Kardex, Intake/Output, MAR and Recent Results.

## 2019-11-30 NOTE — PROGRESS NOTES
DC Plan: Discharge home with MD follow up/referral, family/friend assistance once medically stable    Chart reviewed as CM on call. No dc order noted. Met with pt at bedside earlier this morning. Pt states he lives with grandmother. States a friend/family will drive home at discharge. Pt independent. Pt states he does not have PCP. Will ask CMS staff to assist with PCP referral/appt on Monday, as it is a weekend. No dc concerns identified. CM will cont to follow for any transition of care needs and will be available via hospital  on weekends. Care Management Interventions  PCP Verified by CM:  Yes  Transition of Care Consult (CM Consult): Discharge Planning  Current Support Network: Relative's Home  Confirm Follow Up Transport: Family  Plan discussed with Pt/Family/Caregiver: Yes  Discharge Location  Discharge Placement: Home with family assistance

## 2019-11-30 NOTE — ROUTINE PROCESS
Bedside and Verbal shift change report given to KELLY Virk RN (oncoming nurse) by Raul Underwood RN  (offgoing nurse). Report included the following information SBAR, Kardex, Intake/Output and MAR.

## 2019-11-30 NOTE — ROUTINE PROCESS
TRANSFER - IN REPORT: 
 
Verbal report received from Karla Cameron RN(name) on Benjamin  being received from Telemetry(unit) for routine progression of care Report consisted of patients Situation, Background, Assessment and  
Recommendations(SBAR). Information from the following report(s) SBAR, Kardex, Intake/Output, MAR, Recent Results and Cardiac Rhythm NSR was reviewed with the receiving nurse. Opportunity for questions and clarification was provided. Assessment completed upon patients arrival to unit and care assumed.

## 2019-12-01 LAB
ALBUMIN SERPL-MCNC: 3.3 G/DL (ref 3.4–5)
ALBUMIN/GLOB SERPL: 1.2 {RATIO} (ref 0.8–1.7)
ALP SERPL-CCNC: 67 U/L (ref 45–117)
ALT SERPL-CCNC: 30 U/L (ref 16–61)
ANION GAP SERPL CALC-SCNC: 5 MMOL/L (ref 3–18)
AST SERPL-CCNC: 16 U/L (ref 10–38)
ATRIAL RATE: 89 BPM
BASOPHILS # BLD: 0 K/UL (ref 0–0.1)
BASOPHILS NFR BLD: 0 % (ref 0–2)
BILIRUB SERPL-MCNC: 0.9 MG/DL (ref 0.2–1)
BUN SERPL-MCNC: 6 MG/DL (ref 7–18)
BUN/CREAT SERPL: 6 (ref 12–20)
CALCIUM SERPL-MCNC: 8.9 MG/DL (ref 8.5–10.1)
CALCULATED P AXIS, ECG09: 58 DEGREES
CALCULATED R AXIS, ECG10: 69 DEGREES
CALCULATED T AXIS, ECG11: 52 DEGREES
CHLORIDE SERPL-SCNC: 106 MMOL/L (ref 100–111)
CO2 SERPL-SCNC: 28 MMOL/L (ref 21–32)
CREAT SERPL-MCNC: 0.94 MG/DL (ref 0.6–1.3)
DIAGNOSIS, 93000: NORMAL
DIFFERENTIAL METHOD BLD: ABNORMAL
EOSINOPHIL # BLD: 0.2 K/UL (ref 0–0.4)
EOSINOPHIL NFR BLD: 2 % (ref 0–5)
ERYTHROCYTE [DISTWIDTH] IN BLOOD BY AUTOMATED COUNT: 14.3 % (ref 11.6–14.5)
GLOBULIN SER CALC-MCNC: 2.7 G/DL (ref 2–4)
GLUCOSE SERPL-MCNC: 102 MG/DL (ref 74–99)
HCT VFR BLD AUTO: 47.1 % (ref 36–48)
HGB BLD-MCNC: 15.8 G/DL (ref 13–16)
LYMPHOCYTES # BLD: 1.5 K/UL (ref 0.9–3.6)
LYMPHOCYTES NFR BLD: 13 % (ref 21–52)
MCH RBC QN AUTO: 29.6 PG (ref 24–34)
MCHC RBC AUTO-ENTMCNC: 33.5 G/DL (ref 31–37)
MCV RBC AUTO: 88.4 FL (ref 74–97)
MONOCYTES # BLD: 0.9 K/UL (ref 0.05–1.2)
MONOCYTES NFR BLD: 7 % (ref 3–10)
NEUTS SEG # BLD: 9.1 K/UL (ref 1.8–8)
NEUTS SEG NFR BLD: 78 % (ref 40–73)
P-R INTERVAL, ECG05: 170 MS
PLATELET # BLD AUTO: 193 K/UL (ref 135–420)
PMV BLD AUTO: 9.2 FL (ref 9.2–11.8)
POTASSIUM SERPL-SCNC: 4 MMOL/L (ref 3.5–5.5)
PROT SERPL-MCNC: 6 G/DL (ref 6.4–8.2)
Q-T INTERVAL, ECG07: 368 MS
QRS DURATION, ECG06: 82 MS
QTC CALCULATION (BEZET), ECG08: 447 MS
RBC # BLD AUTO: 5.33 M/UL (ref 4.7–5.5)
SODIUM SERPL-SCNC: 139 MMOL/L (ref 136–145)
VENTRICULAR RATE, ECG03: 89 BPM
WBC # BLD AUTO: 11.7 K/UL (ref 4.6–13.2)

## 2019-12-01 PROCEDURE — 74011250637 HC RX REV CODE- 250/637: Performed by: FAMILY MEDICINE

## 2019-12-01 PROCEDURE — 74011250636 HC RX REV CODE- 250/636: Performed by: PHYSICIAN ASSISTANT

## 2019-12-01 PROCEDURE — 85025 COMPLETE CBC W/AUTO DIFF WBC: CPT

## 2019-12-01 PROCEDURE — 74011250636 HC RX REV CODE- 250/636: Performed by: FAMILY MEDICINE

## 2019-12-01 PROCEDURE — 80053 COMPREHEN METABOLIC PANEL: CPT

## 2019-12-01 PROCEDURE — 74011250637 HC RX REV CODE- 250/637: Performed by: INTERNAL MEDICINE

## 2019-12-01 PROCEDURE — 36415 COLL VENOUS BLD VENIPUNCTURE: CPT

## 2019-12-01 PROCEDURE — 77010033678 HC OXYGEN DAILY

## 2019-12-01 PROCEDURE — 74011000250 HC RX REV CODE- 250: Performed by: FAMILY MEDICINE

## 2019-12-01 PROCEDURE — 65270000029 HC RM PRIVATE

## 2019-12-01 RX ORDER — MAG HYDROX/ALUMINUM HYD/SIMETH 200-200-20
30 SUSPENSION, ORAL (FINAL DOSE FORM) ORAL
Status: DISCONTINUED | OUTPATIENT
Start: 2019-12-01 | End: 2019-12-02 | Stop reason: HOSPADM

## 2019-12-01 RX ORDER — CHOLECALCIFEROL (VITAMIN D3) 125 MCG
5 CAPSULE ORAL
Status: DISCONTINUED | OUTPATIENT
Start: 2019-12-01 | End: 2019-12-02 | Stop reason: HOSPADM

## 2019-12-01 RX ORDER — TRAMADOL HYDROCHLORIDE 50 MG/1
50 TABLET ORAL
Status: DISCONTINUED | OUTPATIENT
Start: 2019-12-01 | End: 2019-12-02 | Stop reason: HOSPADM

## 2019-12-01 RX ADMIN — LITHIUM CARBONATE 300 MG: 300 CAPSULE, GELATIN COATED ORAL at 22:53

## 2019-12-01 RX ADMIN — ONDANSETRON 4 MG: 2 INJECTION INTRAMUSCULAR; INTRAVENOUS at 06:26

## 2019-12-01 RX ADMIN — LITHIUM CARBONATE 300 MG: 300 CAPSULE, GELATIN COATED ORAL at 15:51

## 2019-12-01 RX ADMIN — VANCOMYCIN HYDROCHLORIDE 1500 MG: 1.5 INJECTION, POWDER, LYOPHILIZED, FOR SOLUTION INTRAVENOUS at 22:54

## 2019-12-01 RX ADMIN — LEVOTHYROXINE SODIUM 75 MCG: 75 TABLET ORAL at 09:44

## 2019-12-01 RX ADMIN — IBUPROFEN 600 MG: 600 TABLET ORAL at 09:44

## 2019-12-01 RX ADMIN — LEVOFLOXACIN 750 MG: 5 INJECTION, SOLUTION INTRAVENOUS at 18:37

## 2019-12-01 RX ADMIN — FAMOTIDINE 20 MG: 10 INJECTION, SOLUTION INTRAVENOUS at 09:45

## 2019-12-01 RX ADMIN — VANCOMYCIN HYDROCHLORIDE 1500 MG: 1.5 INJECTION, POWDER, LYOPHILIZED, FOR SOLUTION INTRAVENOUS at 11:00

## 2019-12-01 RX ADMIN — FAMOTIDINE 20 MG: 10 INJECTION, SOLUTION INTRAVENOUS at 20:20

## 2019-12-01 RX ADMIN — ALUMINUM HYDROXIDE, MAGNESIUM HYDROXIDE, AND SIMETHICONE 30 ML: 200; 200; 20 SUSPENSION ORAL at 18:37

## 2019-12-01 RX ADMIN — LITHIUM CARBONATE 300 MG: 300 CAPSULE, GELATIN COATED ORAL at 09:44

## 2019-12-01 RX ADMIN — TRAMADOL HYDROCHLORIDE 50 MG: 50 TABLET, FILM COATED ORAL at 20:19

## 2019-12-01 NOTE — PROGRESS NOTES
Pt can't take trazodone for seizures. Gave melatonin 10 mg but he was bradycardic to 30s-40s while asleep. Decreased dosage to 5 mg for subsequent nights.

## 2019-12-01 NOTE — PROGRESS NOTES
2209: Pt asked for medication to sleep, paged Dr Jonathan Oliver and received order for trazodone. When attempting to give it to the pt, they stated that they don't want it, it causes them to have seizures. Will contact Dr Jonathan Oliver again. 0320: Telemetry tech called several times stating that the pt's heart rate has been running in the mid- 40s to low 30s. Upon assessing the pt, they have been sleeping every time, and when awoken, the pt's heart rate increases to the 80s. Called Dr Jonathan Oliver who stated to continue monitoring the patient and to call if they become symptomatic. Pt resting throughout the shift. No complains of pain, refused several of their ordered medications. Pt's mother at bedside. Pt complaining of nausea and has started coughing up blood. Patient Vitals for the past 12 hrs:   Temp Pulse Resp BP SpO2   12/01/19 0304 97.7 °F (36.5 °C) 80 18 99/60 96 %   12/01/19 0004     100 %   11/30/19 2308    90/58    11/30/19 2305 97.8 °F (36.6 °C) 64 18 94/56 100 %   11/30/19 1942 98.2 °F (36.8 °C) 65 18 108/58 100 %     Bedside and Verbal shift change report given to Priya Thomas RN (oncoming nurse) by Concepcion Espana RN (offgoing nurse). Report included the following information SBAR, Kardex, Intake/Output, MAR and Recent Results.

## 2019-12-01 NOTE — PROGRESS NOTES
Bedside shift change report received from Kaleigh [facility] 2756. Report included the following information SBAR, Kardex, Intake/Output, MAR, Recent Results, Med Rec Status and Cardiac Rhythm NSR/Sinus Bentley Beltrán and plan of care. 0801: Shift assessment complete. See flowsheet. Per Dr. Maria Becerra score not ordered and unnecessary at this point in time. Will not have interventions ordered. 1300: Oxygen via nasal cannula titrated down to room air. Will continue to monitor. 1330: Pt sats 98% on room air. 1543: Reassessment complete. See flowsheet. 1724: Pt called complaining of epigastric pain in burning in stomach. Denies chest pain, any numbness, tingling or shortness of breath. Telemetry rhythm check, NSR. Vitals obtained and charted. Patient sats 100% on room air. Dr. Aman Cherry on this evening and paged. 1745: Discomfort lessened but still has some feelings of indigestion and burning in stomach. 1755: Dr. Aman Cherry returned call. Telephone orders for Mylanta 30ml prn q6h and tramadol 50mg q6h prn. Patient made aware and happy with solution. Bedside shift change report given to JAIRO Schneider RN. Report included the following information SBAR, Kardex, Intake/Output, MAR, Recent Results, Med Rec Status and Cardiac Rhythm NSR/Sinus Smith/Sinus Tach and plan of care.

## 2019-12-01 NOTE — PROGRESS NOTES
Vancomycin trough = 12.5 @ 2120 on 11/30/2019 new dosing regimen as follows:   Estimated Pharmacokinetic Parameters (based on one level)  Vd: 54 L (0.7 L/kg)  Devaughn: 0.091 hr-1 (T1/2 = 7.6 hrs)    Dosing Recommendations  Vancomycin dose: 1500 mg IV Q12hrs (infused over 1.5 hrs)  Estimated peak: 38.2 mcg/mL  Estimated trough: 15.4 mcg/mL  Estimated AUC:TODD: 609 mcg*hr/mL (assumed TODD 1 mcg/mL)    A/P:  1. Recommend vancomycin 1500 mg IV Q12hrs (19 mg/kg)  2. Consider a vancomycin trough level prior to the 4th dose. 3. Please monitor renal function (urine output, BUN/SCr). Dose adjustments may be necessary with a significant change in renal function.

## 2019-12-01 NOTE — PROGRESS NOTES
Hospitalist Progress Note    Patient: Sarah Bravo MRN: [id number]  [facility]: [id number]    YOB: 1990  Age: 29 y.o. Sex: male    DOA: 11/28/2019 LOS:  LOS: 3 days                Assessment and Plan:    Principal Problem:    Pneumonitis (11/29/2019)    Active Problems:    Heroin overdose ([facility] [address] (5/18/2014)      Hypoxia (12/25/2016)      Elevated creatine kinase (11/29/2019)      Bipolar 1 disorder ([facility] [address] (11/29/2019)      Hypoxia, due to pneumonitis and the pulmonary edema. We will continue treatment pneumonitis, DC fluid, wean off [place] oxygen. He is much better and I think we will wean oxygen to off. He is still having Hemoptysis so will recheck xray in am.       Pneumonitis  Off clindamycin, continue Levaquin and Zosyn, vancomycin  Will narrow IV antibiotics after blood culture come back     Renal insufficiency MAURI, resolved and remains ok off of IVFluids     Heroin overdose   No HI/SI   Will watch withdrawal     Bipolar  On lithium /slightly low        Chief complaint:  Hypoxia, elevated creatinine, bipolar, drug abuse         Subjective:  Still coughing up some blood   Feels better overall    Review of systems:    General: No fevers or chills. Cardiovascular: No chest pain or pressure. No palpitations. Pulmonary: No shortness of breath. Gastrointestinal: No nausea, vomiting. Objective:    Vital signs/Intake and Output:  Visit Vitals  /60 (BP 1 Location: Left arm, BP Patient Position: At rest)   Pulse 66   Temp 98.1 °F (36.7 °C)   Resp 18   Ht 5' 7\" (1.702 m)   Wt 76.2 kg (168 lb 1.6 oz)   SpO2 96%   BMI 26.33 kg/m²     Current Shift:  12/01 0701 - 12/01 1900  In: 118 [P.O.:118]  Out: 250 [Urine:250]  Last three shifts:  11/29 1901 - 12/01 0700  In: 9398 [P.O.:440; I.V.:600]  Out: 9156 [Urine:3750]    Physical Exam:  General: NAD, AAOx3. Non-toxic. HEENT: NC/AT. PERRLA, EOMI.  MMM. Lungs: Nml inspection. CTA B/L. No wheezing, rales or rhonchi.    Heart:  S1S2 RRR,  PMI mid 5th IC space. No M/RG. Abdomen: Soft, NT/ND.  BS+. No peritoneal signs. Extremities: No C/C/E. Psych:   Nml affect. Neurologic:  2-12 intact. Strength 5/5 throughout. Sensation symmetrical.          Labs: Results:       Chemistry Recent Labs     12/01/19  0555 11/30/19  0345 11/29/19  0050   * 85 95    140 140   K 4.0 3.9 4.1    106 106   CO2 28 29 27   BUN 6* 7 11   CREA 0.94 1.00 0.98   CA 8.9 8.5 7.9*   AGAP 5 5 7   BUCR 6* 7* 11*   AP 67 68 75   TP 6.0* 5.4* 5.4*   ALB 3.3* 3.1* 3.0*   GLOB 2.7 2.3 2.4   AGRAT 1.2 1.3 1.3      CBC w/Diff Recent Labs     12/01/19  0555 11/30/19  0345 11/29/19  0050 11/28/19  1830   WBC 11.7 13.2 21.7* 11.1   RBC 5.33 5.14 5.14 5.78*   HGB 15.8 14.9 15.2 17.1*   HCT 47.1 46.3 45.8 51.1*    212 202 228   GRANS 78*  --   --  75*   LYMPH 13*  --   --  18*   EOS 2  --   --  1      Cardiac Enzymes Recent Labs     11/29/19  1320 11/29/19  0629    109   CKND1 1.2 1.3      Coagulation Recent Labs     11/28/19  1830   PTP 12.4   INR 0.9   APTT 24.3       Lipid Panel No results found for: CHOL, CHOLPOCT, CHOLX, CHLST, CHOLV, [id number], HDL, HDLP, LDL, LDLC, DLDLP, [id number], VLDLC, VLDL, TGLX, TRIGL, TRIGP, TGLPOCT, CHHD, CHHDX   BNP No results for input(s): BNPP in the last 72 hours.    Liver Enzymes Recent Labs     12/01/19  0555   TP 6.0*   ALB 3.3*   AP 67   SGOT 16      Thyroid Studies No results found for: T4, T3U, TSH, TSHEXT     Procedures/imaging: see electronic medical records for all procedures/Xrays and details which were not copied into this note but were reviewed prior to creation of Plan

## 2019-12-02 ENCOUNTER — APPOINTMENT (OUTPATIENT)
Dept: GENERAL RADIOLOGY | Age: 29
DRG: 816 | End: 2019-12-02
Attending: INTERNAL MEDICINE
Payer: MEDICAID

## 2019-12-02 VITALS
TEMPERATURE: 98 F | HEIGHT: 67 IN | BODY MASS INDEX: 26.16 KG/M2 | HEART RATE: 77 BPM | WEIGHT: 166.7 LBS | SYSTOLIC BLOOD PRESSURE: 105 MMHG | OXYGEN SATURATION: 100 % | DIASTOLIC BLOOD PRESSURE: 53 MMHG | RESPIRATION RATE: 16 BRPM

## 2019-12-02 LAB
ALBUMIN SERPL-MCNC: 3.4 G/DL (ref 3.4–5)
ALBUMIN/GLOB SERPL: 1.3 {RATIO} (ref 0.8–1.7)
ALP SERPL-CCNC: 69 U/L (ref 45–117)
ALT SERPL-CCNC: 30 U/L (ref 16–61)
ANION GAP SERPL CALC-SCNC: 5 MMOL/L (ref 3–18)
AST SERPL-CCNC: 17 U/L (ref 10–38)
BASOPHILS # BLD: 0 K/UL (ref 0–0.1)
BASOPHILS NFR BLD: 0 % (ref 0–2)
BILIRUB SERPL-MCNC: 0.9 MG/DL (ref 0.2–1)
BUN SERPL-MCNC: 7 MG/DL (ref 7–18)
BUN/CREAT SERPL: 7 (ref 12–20)
CALCIUM SERPL-MCNC: 9.1 MG/DL (ref 8.5–10.1)
CHLORIDE SERPL-SCNC: 105 MMOL/L (ref 100–111)
CO2 SERPL-SCNC: 30 MMOL/L (ref 21–32)
CREAT SERPL-MCNC: 1.01 MG/DL (ref 0.6–1.3)
DIFFERENTIAL METHOD BLD: ABNORMAL
EOSINOPHIL # BLD: 0.2 K/UL (ref 0–0.4)
EOSINOPHIL NFR BLD: 1 % (ref 0–5)
ERYTHROCYTE [DISTWIDTH] IN BLOOD BY AUTOMATED COUNT: 14.3 % (ref 11.6–14.5)
GLOBULIN SER CALC-MCNC: 2.6 G/DL (ref 2–4)
GLUCOSE SERPL-MCNC: 90 MG/DL (ref 74–99)
HAV IGM SER QL: NEGATIVE
HBV CORE IGM SER QL: NEGATIVE
HBV SURFACE AG SER QL: <0.1 INDEX
HBV SURFACE AG SER QL: NEGATIVE
HCT VFR BLD AUTO: 48.3 % (ref 36–48)
HCV AB SER IA-ACNC: >11 INDEX
HCV AB SERPL QL IA: POSITIVE
HCV COMMENT,HCGAC: ABNORMAL
HGB BLD-MCNC: 15.9 G/DL (ref 13–16)
HIV 1+2 AB+HIV1 P24 AG SERPL QL IA: NONREACTIVE
HIV12 RESULT COMMENT, HHIVC: NORMAL
LYMPHOCYTES # BLD: 1.5 K/UL (ref 0.9–3.6)
LYMPHOCYTES NFR BLD: 12 % (ref 21–52)
MCH RBC QN AUTO: 29.2 PG (ref 24–34)
MCHC RBC AUTO-ENTMCNC: 32.9 G/DL (ref 31–37)
MCV RBC AUTO: 88.8 FL (ref 74–97)
MONOCYTES # BLD: 1 K/UL (ref 0.05–1.2)
MONOCYTES NFR BLD: 8 % (ref 3–10)
NEUTS SEG # BLD: 9.7 K/UL (ref 1.8–8)
NEUTS SEG NFR BLD: 79 % (ref 40–73)
PLATELET # BLD AUTO: 229 K/UL (ref 135–420)
PMV BLD AUTO: 9.8 FL (ref 9.2–11.8)
POTASSIUM SERPL-SCNC: 3.7 MMOL/L (ref 3.5–5.5)
PROT SERPL-MCNC: 6 G/DL (ref 6.4–8.2)
RBC # BLD AUTO: 5.44 M/UL (ref 4.7–5.5)
SODIUM SERPL-SCNC: 140 MMOL/L (ref 136–145)
SP1: ABNORMAL
SP2: ABNORMAL
SP3: ABNORMAL
WBC # BLD AUTO: 12.4 K/UL (ref 4.6–13.2)

## 2019-12-02 PROCEDURE — 74011250637 HC RX REV CODE- 250/637: Performed by: FAMILY MEDICINE

## 2019-12-02 PROCEDURE — 71046 X-RAY EXAM CHEST 2 VIEWS: CPT

## 2019-12-02 PROCEDURE — 85025 COMPLETE CBC W/AUTO DIFF WBC: CPT

## 2019-12-02 PROCEDURE — 36415 COLL VENOUS BLD VENIPUNCTURE: CPT

## 2019-12-02 PROCEDURE — 80053 COMPREHEN METABOLIC PANEL: CPT

## 2019-12-02 RX ORDER — LEVOFLOXACIN 500 MG/1
500 TABLET, FILM COATED ORAL DAILY
Qty: 7 TAB | Refills: 0 | Status: SHIPPED | OUTPATIENT
Start: 2019-12-02 | End: 2019-12-09

## 2019-12-02 RX ADMIN — LITHIUM CARBONATE 300 MG: 300 CAPSULE, GELATIN COATED ORAL at 09:26

## 2019-12-02 RX ADMIN — LEVOTHYROXINE SODIUM 75 MCG: 75 TABLET ORAL at 08:04

## 2019-12-02 RX ADMIN — TRAMADOL HYDROCHLORIDE 50 MG: 50 TABLET, FILM COATED ORAL at 09:45

## 2019-12-02 NOTE — DISCHARGE INSTRUCTIONS
Patient Education        Alcohol, Drug, or Poison Ingestion: Care Instructions  Your Care Instructions    A person can become very sick, or die, from swallowing or using alcohol, drugs, or poisons. Alcohol poisoning occurs when a person drinks a large amount of alcohol. Alcohol can stop nerve signals that control breathing. It can also stop the gag reflex that prevents choking. Alcohol poisoning is serious. It can lead to brain damage or death if it's not treated right away. Drugs can be used by accident or on purpose. They can be swallowed, inhaled, injected, or absorbed through the skin. Drugs include over-the-counter medicine (such as aspirin or acetaminophen) and prescription medicine. They also include vitamins and supplements. And they include illegal drugs such as cocaine and heroin. And poisons are all around us. They include household , cosmetics, houseplants, and garden chemicals. The doctor has checked you carefully, but problems can develop later. If you notice any problems or new symptoms, get medical treatment right away. Follow-up care is a key part of your treatment and safety. Be sure to make and go to all appointments, and call your doctor if you are having problems. It's also a good idea to know your test results and keep a list of the medicines you take. How can you care for yourself at home? Alcohol problems  · Talk to your doctor or counselor about programs that can help you stop using alcohol. · Plan ways to avoid being tempted to drink. ? Get rid of all alcohol in your home. ? Avoid places where you tend to drink. ? Stay away from places or events that offer alcohol. ? Stay away from people who drink a lot. Drug problems  · Talk to your doctor about programs that can help you stop using drugs. · Get rid of any drugs you might be tempted to misuse. · Learn how to say no when other people use drugs. · Don't spend time with people who use drugs.   Poison prevention  · Keep products in the containers they came in. Keep them with the original labels. · Be careful when you use cleaning products, paints, solvents, and pesticides. Read labels before use. Use a fan to move strong odors and fumes out of your home. · Do not mix cleaning products. Try to use nontoxic . These include vinegar, lemon juice, and baking soda. When should you call for help? Poison control centers, hospitals, or your doctor can give immediate advice in the case of a poisoning. The [facility] [employer] number is 3-422-165-6536. Have the poison container with you so you can give complete information to the poison control center, such as what the poison or substance is, how much was taken and when. Do not try to make the person vomit.   Call 911 anytime you think you may need emergency care. For example, call if you or someone else:    · Has used or currently uses alcohol or drugs and is very confused or can't stay awake.     · Has passed out (lost consciousness).     · Has severe trouble breathing.     · Is having a seizure.    Call your doctor now or seek immediate medical care if you or someone else:    · Has new symptoms, or is not acting normally.    Watch closely for changes in your health, and be sure to contact your doctor if:    · You do not get better as expected.     · You need help with drug or alcohol problems.     · You have problems with depression or other mental health issues. Where can you learn more? Go to http://sujatha-danielle.info/. Enter F119 in the search box to learn more about \"Alcohol, Drug, or Poison Ingestion: Care Instructions. \"  Current as of: June 26, 2019  Content Version: 12.2  © 4352-0725 [employer] Care instructions adapted under license by [employer] (which disclaims liability or warranty for this information).  If you have questions about a medical condition or this instruction, always ask your healthcare professional. [place] [address] any warranty or liability for your use of this information. Patient Education        Pneumonia: Care Instructions  Your Care Instructions    Pneumonia is an infection of the lungs. Most cases are caused by infections from bacteria or viruses. Pneumonia may be mild or very severe. If it is caused by bacteria, you will be treated with antibiotics. It may take a few weeks to a few months to recover fully from pneumonia, depending on how sick you were and whether your overall health is good. Follow-up care is a key part of your treatment and safety. Be sure to make and go to all appointments, and call your doctor if you are having problems. It's also a good idea to know your test results and keep a list of the medicines you take. How can you care for yourself at home? · Take your antibiotics exactly as directed. Do not stop taking the medicine just because you are feeling better. You need to take the full course of antibiotics. · Take your medicines exactly as prescribed. Call your doctor if you think you are having a problem with your medicine. · Get plenty of rest and sleep. You may feel weak and tired for a while, but your energy level will improve with time. · To prevent dehydration, drink plenty of fluids, enough so that your urine is light yellow or clear like water. Choose water and other caffeine-free clear liquids until you feel better. If you have kidney, heart, or liver disease and have to limit fluids, talk with your doctor before you increase the amount of fluids you drink. · Take care of your cough so you can rest. A cough that brings up mucus from your lungs is common with pneumonia. It is one way your body gets rid of the infection. But if coughing keeps you from resting or causes severe fatigue and chest-wall pain, talk to your doctor. He or she may suggest that you take a medicine to reduce the cough.   · Use a vaporizer or humidifier to add moisture to your bedroom. Follow the directions for cleaning the machine. · Do not smoke or allow others to smoke around you. Smoke will make your cough last longer. If you need help quitting, talk to your doctor about stop-smoking programs and medicines. These can increase your chances of quitting for good. · Take an over-the-counter pain medicine, such as acetaminophen (Tylenol), ibuprofen (Advil, Motrin), or naproxen (Aleve). Read and follow all instructions on the label. · Do not take two or more pain medicines at the same time unless the doctor told you to. Many pain medicines have acetaminophen, which is Tylenol. Too much acetaminophen (Tylenol) can be harmful. · If you were given a spirometer to measure how well your lungs are working, use it as instructed. This can help your doctor tell how your recovery is going. · To prevent pneumonia in the future, talk to your doctor about getting a flu vaccine (once a year) and a pneumococcal vaccine (one time only for most people). When should you call for help? Call 911 anytime you think you may need emergency care. For example, call if:    · You have severe trouble breathing.    Call your doctor now or seek immediate medical care if:    · You cough up dark brown or bloody mucus (sputum).     · You have new or worse trouble breathing.     · You are dizzy or lightheaded, or you feel like you may faint.    Watch closely for changes in your health, and be sure to contact your doctor if:    · You have a new or higher fever.     · You are coughing more deeply or more often.     · You are not getting better after 2 days (48 hours).     · You do not get better as expected. Where can you learn more? Go to http://sujatha-danielle.info/. Enter [id number] in the search box to learn more about \"Pneumonia: Care Instructions. \"  Current as of: June 9, 2019  Content Version: 12.2  © 0630-0813 [employer].  Care instructions adapted under license by [employer] (which disclaims liability or warranty for this information). If you have questions about a medical condition or this instruction, always ask your healthcare professional. [address] any warranty or liability for your use of this information.

## 2019-12-02 NOTE — PROGRESS NOTES
Bedside and Verbal shift change report given by KELLY Dobson (off going nurse) to Cali Angel (on coming nurse).  Report included the following information SBAR, Kardex, OR Summary, Intake/Output and MAR

## 2019-12-02 NOTE — PROGRESS NOTES
Noted pt requested assistance with a PCP. CMS has been notified to assist.  CSB information has also been placed in pt's AVS to assist with transition of care. Noted pt is to be discharged today. No other transition of care needs have been identified. Care Management Interventions  PCP Verified by CM: Yes  Mode of Transport at Discharge:  Other (see comment)(Family/friend)  Transition of Care Consult (CM Consult): Discharge Planning  Health Maintenance Reviewed: Yes  Current Support Network: Relative's Home  Confirm Follow Up Transport: Family  Plan discussed with Pt/Family/Caregiver: Yes  Discharge Location  Discharge Placement: Home with family assistance

## 2019-12-02 NOTE — DISCHARGE SUMMARY
Discharge Summary    Patient: Sarina Corrigan MRN: [id number]  CSN: [id number]    YOB: 1990  Age: 29 y.o. Sex: male    DOA: 11/28/2019 LOS:  LOS: 4 days   Discharge Date:      Primary Care Provider:  None    Admission Diagnoses: Heroin overdose [facility]) [T40.1X1A]    Discharge Diagnoses:    Hospital Problems  Date Reviewed: 11/28/2019          Codes Class Noted POA    * (Principal) Pneumonitis ICD-10-CM: J18.9  ICD-9-CM: 391  11/29/2019 Unknown        Elevated creatine kinase ICD-10-CM: R74.8  ICD-9-CM: 790.5  11/29/2019 Unknown        Bipolar 1 disorder ([facility] [address] ICD-10-CM: F31.9  ICD-9-CM: 296.7  11/29/2019 Unknown        Hypoxia ICD-10-CM: R09.02  ICD-9-CM: 799.02  12/25/2016 Yes        [facility] ICD-10-CM: T40.1X1A  ICD-9-CM: 965.01, E980.0  5/18/2014 Yes              Discharge Condition: stable     Discharge Medications:     Current Discharge Medication List      START taking these medications    Details   levoFLOXacin (LEVAQUIN) 500 mg tablet Take 1 Tab by mouth daily for 7 days. Qty: 7 Tab, Refills: 0         CONTINUE these medications which have NOT CHANGED    Details   perphenazine (TRILAFON) 2 mg tablet Take 2 mg by mouth three (3) times daily. lithium carbonate 300 mg capsule Take  by mouth three (3) times daily (with meals). levothyroxine (SYNTHROID) 75 mcg tablet Take  by mouth Daily (before breakfast). Indications: hypothyroidism      hydrOXYzine HCl (ATARAX) 50 mg tablet Take 1 Tab by mouth every six (6) hours as needed for Itching. Qty: 10 Tab, Refills: 0      OLANZapine (ZYPREXA) 15 mg tablet Take 15 mg by mouth nightly. hydrOXYzine pamoate (VISTARIL) 100 mg capsule Take 100 mg by mouth three (3) times daily as needed for Itching. albuterol (PROVENTIL HFA, VENTOLIN HFA, PROAIR HFA) 90 mcg/actuation inhaler Take 2 Puffs by inhalation every four (4) hours as needed for Wheezing. Qty: 1 Inhaler, Refills: 1      ALPRAZOLAM (XANAX PO) Take 1 mg by mouth. Procedures : None    Consults: None      PHYSICAL EXAM   Visit Vitals  /53 (BP 1 Location: Left arm, BP Patient Position: At rest)   Pulse 77   Temp 98 °F (36.7 °C)   Resp 16   Ht 5' 7\" (1.702 m)   Wt 75.6 kg (166 lb 11.2 oz)   SpO2 100%   BMI 26.11 kg/m²     General: Awake, cooperative, no acute distress    HEENT: NC, Atraumatic. PERRLA, EOMI. Anicteric sclerae. Lungs:  CTA Bilaterally. No Wheezing/Rhonchi/Rales. Heart:  Regular  rhythm,  No murmur, No Rubs, No Gallops  Abdomen: Soft, Non distended, Non tender. +Bowel sounds,   Extremities: No c/c/e  Psych:   Not anxious or agitated. Neurologic:  No acute neurological deficits. Admission HPI :     Robert Benoit is a 29 y.o. male who has a hx of bipolar disorder on lithium, heroin abuse, and hepatitis C presents by EMS after family members called when his RR was 4 at home. Suspected heroin overdose and given narcan in the field and ER. Initial sats 88% on RA so was placed on NRB. Law enforcement at bedside and pt is not very forthcoming with history. Denies SI/HI. Hospital Course :   Robert Benoit is a 29 y.o. male who has a hx of bipolar disorder on lithium, heroin abuse, and hepatitis C was admitted due to hypoxia secondary due to pneumonitis. Since he was admitted to the hospital, broad coverage IV antibiotics were started with the breathing treatment and oxygen support. IV antibiotics was narrowed after negative culture and clinical improving. He was able to wean off oxygen before discharge. His wheezing and shortness breath resolved. He was  this discharge with p.o. antibiotics. On admission he had elevated creatinine. IV hydration was giving, his creatinine was back to normal on discharge. We continue on lithium for his bipolar disorder. He is heroin abuser, recommended follow-up with rehab program.  On discharge he remained afebrile, leukocytosis resolved and hemodynamic stable.     Activity: Activity as tolerated    Diet: Regular Diet    Follow-up: PCM and csb     Disposition: home     Minutes spent on discharge: 45 min       Labs: Results:       Chemistry Recent Labs     12/02/19 0433 12/01/19 0555 11/30/19  0345   GLU 90 102* 85    139 140   K 3.7 4.0 3.9    106 106   CO2 30 28 29   BUN 7 6* 7   CREA 1.01 0.94 1.00   CA 9.1 8.9 8.5   AGAP 5 5 5   BUCR 7* 6* 7*   AP 69 67 68   TP 6.0* 6.0* 5.4*   ALB 3.4 3.3* 3.1*   GLOB 2.6 2.7 2.3   AGRAT 1.3 1.2 1.3      CBC w/Diff Recent Labs     12/02/19 0433 12/01/19 0555 11/30/19  0345   WBC 12.4 11.7 13.2   RBC 5.44 5.33 5.14   HGB 15.9 15.8 14.9   HCT 48.3* 47.1 46.3    193 212   GRANS 79* 78*  --    LYMPH 12* 13*  --    EOS 1 2  --       Cardiac Enzymes Recent Labs     11/29/19  1320      CKND1 1.2      Coagulation No results for input(s): PTP, INR, APTT, INREXT in the last 72 hours. Lipid Panel No results found for: CHOL, CHOLPOCT, CHOLX, CHLST, CHOLV, 121094, HDL, HDLP, LDL, LDLC, DLDLP, 146221, VLDLC, VLDL, TGLX, TRIGL, TRIGP, TGLPOCT, CHHD, CHHDX   BNP No results for input(s): BNPP in the last 72 hours. Liver Enzymes Recent Labs     12/02/19  0433   TP 6.0*   ALB 3.4   AP 69   SGOT 17      Thyroid Studies No results found for: T4, T3U, TSH, TSHEXT         Significant Diagnostic Studies: Xr Chest Pa Lat    Result Date: 12/2/2019  EXAM: XR CHEST PA LAT CLINICAL INDICATION/HISTORY: Cough -Additional: Chest pain COMPARISON: Several prior exams, most recently 11/28/2019 TECHNIQUE: PA and lateral views of the chest _______________ FINDINGS: HEART AND MEDIASTINUM: Cardiac size and mediastinal contours are within normal limits LUNGS AND PLEURAL SPACES: Significant interval improvement in pulmonary aeration from the prior examination. No pneumothorax or pleural effusion. Minimal perihilar predominant opacities remain. BONY THORAX AND SOFT TISSUES: No acute osseous abnormality _______________     IMPRESSION: 1.  Overall significant trend towards improvement with minimal perihilar predominant airspace disease. Cta Chest W Or W Wo Cont    Result Date: 11/28/2019  EXAM: CTA chest INDICATION: Chest pain COMPARISON: December 5, 2011 TECHNIQUE: Axial CT imaging from the thoracic inlet through the diaphragm with intravenous contrast. Coronal and sagittal MIP reformats were generated. One or more dose reduction techniques were used on this CT: automated exposure control, adjustment of the mAs and/or kVp according to patient size, and iterative reconstruction techniques. The specific techniques used on this CT exam have been documented in the patient's electronic medical record. Digital Imaging and Communications in Medicine (DICOM) format image data are available to nonaffiliated external healthcare facilities or entities on a secure, media free, reciprocally searchable basis with patient authorization for at least a 12-month period after this study. _______________ FINDINGS: EXAM QUALITY: Overall exam quality is satisfactory. Pulmonary arterial enhancement is adequate with suboptimal breath hold and there is moderate breathing motion artifact. PULMONARY ARTERIES: No evidence of pulmonary embolism. LYMPH Nodes: No enlarged lymph nodes seen. PLEURA: No pleural effusion seen. HEART: Normal without pericardial effusion. VASCULATURE/MEDIASTINUM: Small hiatal hernia present. Is no aortic dissection. LUNGS: There are diffuse bilateral interstitial and alveolar infiltrates especially along the posterior aspect of all the lobes suggesting pulmonary edema/pneumonia and/or aspiration. AIRWAY: Patent UPPER ABDOMEN: Unremarkable. OTHER: No acute or aggressive osseous abnormalities identified. _______________     IMPRESSION: No evidence of a pulmonary embolism or aortic dissection. Interstitial and alveolar infiltrates in both lungs diffusely especially along the posterior aspect of both lower lobes. May represent pneumonia/aspiration and/or pulmonary edema. Xr Chest Port    Result Date: 11/28/2019  EXAM: AP portable upright chest INDICATION: Overdose, aspiration COMPARISON: December 26, 2016 _______________ FINDINGS: Cardiac thymic silhouette is normal. There are bilateral increased interstitial lung markings which may represent aspiration/pneumonitis or pulmonary edema. There are no effusions or pneumothorax. _______________     IMPRESSION: Bilateral increased perihilar and lower lobe interstitial lung markings suggesting possible pulmonary edema/pneumonitis/aspiration. No effusions or pneumothorax seen.              [facility]     CC: None

## 2019-12-02 NOTE — PROGRESS NOTES
2310: Pt refused Melatonin, states that they take Remeron for sleep. Pt had quiet night, medicated once for heartburn at the beginning of shift. Pt coughed up blood. Patient Vitals for the past 12 hrs:   Temp Pulse Resp BP SpO2   12/02/19 0403 97.6 °F (36.4 °C) 76 18 99/64 100 %   12/02/19 0120     98 %   12/01/19 2303 97.7 °F (36.5 °C) 62 16 101/58 98 %     Bedside and Verbal shift change report given to Ridge Burgos (oncoming nurse) by Shayy Alvarado RN (offgoing nurse). Report included the following information SBAR, Kardex, Intake/Output, MAR and Recent Results.

## 2019-12-04 LAB
BACTERIA SPEC CULT: NORMAL
SERVICE CMNT-IMP: NORMAL

## 2020-04-06 ENCOUNTER — HOSPITAL ENCOUNTER (EMERGENCY)
Age: 30
Discharge: HOME | End: 2020-04-06
Attending: EMERGENCY MEDICINE
Payer: MEDICAID

## 2020-04-06 ENCOUNTER — APPOINTMENT (OUTPATIENT)
Dept: GENERAL RADIOLOGY | Age: 30
End: 2020-04-06
Attending: EMERGENCY MEDICINE
Payer: MEDICAID

## 2020-04-06 ENCOUNTER — APPOINTMENT (OUTPATIENT)
Dept: VASCULAR SURGERY | Age: 30
End: 2020-04-06
Attending: EMERGENCY MEDICINE
Payer: MEDICAID

## 2020-04-06 VITALS
SYSTOLIC BLOOD PRESSURE: 119 MMHG | HEART RATE: 68 BPM | WEIGHT: 210 LBS | TEMPERATURE: 98.4 F | OXYGEN SATURATION: 97 % | RESPIRATION RATE: 10 BRPM | DIASTOLIC BLOOD PRESSURE: 74 MMHG | BODY MASS INDEX: 32.96 KG/M2 | HEIGHT: 67 IN

## 2020-04-06 DIAGNOSIS — F19.10 POLYSUBSTANCE ABUSE (HCC): Primary | ICD-10-CM

## 2020-04-06 DIAGNOSIS — I82.622 ACUTE DEEP VEIN THROMBOSIS (DVT) OF OTHER VEIN OF LEFT UPPER EXTREMITY (HCC): ICD-10-CM

## 2020-04-06 DIAGNOSIS — R74.01 TRANSAMINITIS: ICD-10-CM

## 2020-04-06 LAB
ALBUMIN SERPL-MCNC: 3.2 G/DL (ref 3.4–5)
ALBUMIN/GLOB SERPL: 1.2 {RATIO} (ref 0.8–1.7)
ALP SERPL-CCNC: 97 U/L (ref 45–117)
ALT SERPL-CCNC: 123 U/L (ref 16–61)
AMPHET UR QL SCN: NEGATIVE
ANION GAP SERPL CALC-SCNC: 5 MMOL/L (ref 3–18)
APPEARANCE UR: CLEAR
AST SERPL-CCNC: 106 U/L (ref 10–38)
BARBITURATES UR QL SCN: NEGATIVE
BASOPHILS # BLD: 0 K/UL (ref 0–0.1)
BASOPHILS NFR BLD: 0 % (ref 0–2)
BENZODIAZ UR QL: NEGATIVE
BILIRUB SERPL-MCNC: 0.5 MG/DL (ref 0.2–1)
BILIRUB UR QL: NEGATIVE
BNP SERPL-MCNC: 63 PG/ML (ref 0–450)
BUN SERPL-MCNC: 10 MG/DL (ref 7–18)
BUN/CREAT SERPL: 10 (ref 12–20)
CALCIUM SERPL-MCNC: 8.8 MG/DL (ref 8.5–10.1)
CANNABINOIDS UR QL SCN: POSITIVE
CHLORIDE SERPL-SCNC: 103 MMOL/L (ref 100–111)
CO2 SERPL-SCNC: 32 MMOL/L (ref 21–32)
COCAINE UR QL SCN: NEGATIVE
COLOR UR: YELLOW
CREAT SERPL-MCNC: 1.02 MG/DL (ref 0.6–1.3)
DIFFERENTIAL METHOD BLD: ABNORMAL
EOSINOPHIL # BLD: 0.2 K/UL (ref 0–0.4)
EOSINOPHIL NFR BLD: 2 % (ref 0–5)
ERYTHROCYTE [DISTWIDTH] IN BLOOD BY AUTOMATED COUNT: 13.1 % (ref 11.6–14.5)
ETHANOL SERPL-MCNC: <3 MG/DL (ref 0–3)
GLOBULIN SER CALC-MCNC: 2.7 G/DL (ref 2–4)
GLUCOSE SERPL-MCNC: 122 MG/DL (ref 74–99)
GLUCOSE UR STRIP.AUTO-MCNC: NEGATIVE MG/DL
HCT VFR BLD AUTO: 42.2 % (ref 36–48)
HDSCOM,HDSCOM: ABNORMAL
HGB BLD-MCNC: 14.7 G/DL (ref 13–16)
HGB UR QL STRIP: NEGATIVE
KETONES UR QL STRIP.AUTO: NEGATIVE MG/DL
LEUKOCYTE ESTERASE UR QL STRIP.AUTO: NEGATIVE
LYMPHOCYTES # BLD: 1.5 K/UL (ref 0.9–3.6)
LYMPHOCYTES NFR BLD: 18 % (ref 21–52)
MCH RBC QN AUTO: 29.9 PG (ref 24–34)
MCHC RBC AUTO-ENTMCNC: 34.8 G/DL (ref 31–37)
MCV RBC AUTO: 85.9 FL (ref 74–97)
METHADONE UR QL: POSITIVE
MONOCYTES # BLD: 0.7 K/UL (ref 0.05–1.2)
MONOCYTES NFR BLD: 9 % (ref 3–10)
NEUTS SEG # BLD: 5.9 K/UL (ref 1.8–8)
NEUTS SEG NFR BLD: 71 % (ref 40–73)
NITRITE UR QL STRIP.AUTO: NEGATIVE
OPIATES UR QL: NEGATIVE
PCP UR QL: NEGATIVE
PH UR STRIP: 6 [PH] (ref 5–8)
PLATELET # BLD AUTO: 207 K/UL (ref 135–420)
PMV BLD AUTO: 8.7 FL (ref 9.2–11.8)
POTASSIUM SERPL-SCNC: 4.3 MMOL/L (ref 3.5–5.5)
PROT SERPL-MCNC: 5.9 G/DL (ref 6.4–8.2)
PROT UR STRIP-MCNC: NEGATIVE MG/DL
RBC # BLD AUTO: 4.91 M/UL (ref 4.7–5.5)
SODIUM SERPL-SCNC: 140 MMOL/L (ref 136–145)
SP GR UR REFRACTOMETRY: 1 (ref 1–1.03)
UROBILINOGEN UR QL STRIP.AUTO: 0.2 EU/DL (ref 0.2–1)
WBC # BLD AUTO: 8.3 K/UL (ref 4.6–13.2)

## 2020-04-06 PROCEDURE — 83880 ASSAY OF NATRIURETIC PEPTIDE: CPT

## 2020-04-06 PROCEDURE — 80307 DRUG TEST PRSMV CHEM ANLYZR: CPT

## 2020-04-06 PROCEDURE — 93971 EXTREMITY STUDY: CPT

## 2020-04-06 PROCEDURE — 80053 COMPREHEN METABOLIC PANEL: CPT

## 2020-04-06 PROCEDURE — 99284 EMERGENCY DEPT VISIT MOD MDM: CPT

## 2020-04-06 PROCEDURE — 93970 EXTREMITY STUDY: CPT

## 2020-04-06 PROCEDURE — 71045 X-RAY EXAM CHEST 1 VIEW: CPT

## 2020-04-06 PROCEDURE — 85025 COMPLETE CBC W/AUTO DIFF WBC: CPT

## 2020-04-06 PROCEDURE — 81003 URINALYSIS AUTO W/O SCOPE: CPT

## 2020-04-06 RX ORDER — RIVAROXABAN 15 MG-20MG
KIT ORAL
Qty: 1 DOSE PACK | Refills: 0 | Status: SHIPPED | OUTPATIENT
Start: 2020-04-06

## 2020-04-06 NOTE — DISCHARGE INSTRUCTIONS
You were seen and evaluated in the Emergency Department. Please understand that your work up is not all encompassing and you should follow up with your primary care physician for further management and continuity of care. Please return to Emergency Department or seek medical attention immediately if you have acute worsening in your symptoms or develop chest pain, shortness of breath, repeated vomiting, fever, altered level of consciousness, coughing up blood, or start sweating and feel clammy. If you were prescribed any medicine for home, please take as prescribed by your health-care provider. If you were given any follow-up appointments or numbers to call, please do so as instructed. Avoid any tobacco products or excessive alcohol. Patient Education        Deep Vein Thrombosis: Care Instructions  Your Care Instructions    A deep vein thrombosis (DVT) is a blood clot in certain veins of the legs, pelvis, or arms. Blood clots in these veins need to be treated because they can get bigger, break loose, and travel through the bloodstream to the lungs. A blood clot in a lung can be life-threatening. The doctor may have given you a blood thinner (anticoagulant). A blood thinner can stop the blood clot from growing larger and prevent new clots from forming. You will need to take a blood thinner for 3 to 6 months or longer. The doctor has checked you carefully, but problems can develop later. If you notice any problems or new symptoms, get medical treatment right away. Follow-up care is a key part of your treatment and safety. Be sure to make and go to all appointments, and call your doctor if you are having problems. It's also a good idea to know your test results and keep a list of the medicines you take. How can you care for yourself at home? · Take your medicines exactly as prescribed. Call your doctor if you think you are having a problem with your medicine.   · If you are taking a blood thinner, be sure you get instructions about how to take your medicine safely. Blood thinners can cause serious bleeding problems. · Wear compression stockings if your doctor recommends them. These stockings are tighter at the feet than on the legs. They may reduce pain and swelling in your legs. But there are different types of stockings, and they need to fit right. So your doctor will recommend what you need. · When you sit, use a pillow to raise the arm or leg that has the blood clot. Try to keep it above the level of your heart. When should you call for help? Call 911 anytime you think you may need emergency care. For example, call if:    · You passed out (lost consciousness).     · You have symptoms of a blood clot in your lung (called a pulmonary embolism). These include:  ? Sudden chest pain. ? Trouble breathing. ? Coughing up blood.    Call your doctor now or seek immediate medical care if:    · You have new or worse trouble breathing.     · You are dizzy or lightheaded, or you feel like you may faint.     · You have symptoms of a blood clot in your arm or leg. These may include:  ? Pain in the arm, calf, back of the knee, thigh, or groin. ? Redness and swelling in the arm, leg, or groin.    Watch closely for changes in your health, and be sure to contact your doctor if:    · You do not get better as expected. Where can you learn more? Go to http://sujatha-danielle.info/  Enter L446 in the search box to learn more about \"Deep Vein Thrombosis: Care Instructions. \"  Current as of: July 14, 2019Content Version: 12.4  © 4673-5701 Health[employer] Incorporated. Care instructions adapted under license by [employer] (which disclaims liability or warranty for this information). If you have questions about a medical condition or this instruction, always ask your healthcare professional. [address] any warranty or liability for your use of this information.          Patient Education        Learning About How to Prevent Blood Clots  What is a blood clot? A blood clot is a clump of blood that forms in a blood vessel, such as a vein or an artery. If a clot gets stuck in a blood vessel, it can cause serious problems like a deep vein thrombosis (DVT) or a pulmonary embolism. A DVT is a blood clot in certain veins of the legs, pelvis, or arms. It most often occurs in the legs. Blood clots in these veins need to be treated, because they can get bigger, break loose, and travel through the bloodstream to the heart and then to the lungs. This causes a pulmonary embolism. A pulmonary embolism is a sudden blockage of an artery in the lung. Blood clots in the deep veins of the leg are the most common cause of a pulmonary embolism. In many cases, the clots are small. They may damage the lung. But if the clot is large and stops blood flow to the lung, it can be deadly. What increases your risk for blood clots? Some of the things that can increase your risk for a blood clot include:  Slowed blood flow  When blood doesn't flow normally, clots are more likely to develop. Reduced blood flow may result from long-term bed rest, such as after a surgery, injury, or serious illness. Or it may result from sitting for a long time, especially when traveling long distances. Abnormal clotting  Some people have blood that clots too easily or too quickly. Problems that may cause increased clotting include:  · Having certain blood problems that make blood clot too easily. This is a problem that may run in families. · Having certain health problems, such as cancer, heart failure, stroke, or severe infection. · Being pregnant. A woman's risk of getting blood clots increases both during pregnancy and shortly after delivery or after a  section. · Using hormonal forms of birth control or hormone therapy. · Smoking.   Injury to the blood vessel wall  Blood is more likely to clot in veins and arteries shortly after they are injured. Injury can be caused by a recent medical procedure or surgery that involved your legs, hips, belly, or brain. Or it can be caused by an injury, such as a broken hip. What can you do to prevent blood clots? After any procedure or event that increases your risk  · Take a blood-thinning medicine (called an anticoagulant) as directed if your doctor prescribes one. · Exercise your lower leg muscles to help keep the blood moving through your legs. Point your toes up toward your head so the calves of your legs are stretched, then relax. Repeat. This is a good exercise to do when you are sitting for long periods of time. · Get up out of bed as soon as you safely can or as soon as your doctor says it's okay after an illness or surgery. If you can't get out of bed, you can do the leg exercise described above. Try to do this leg exercise every hour when you are awake. This will help keep the blood moving through your legs. If you are in the hospital and need to stay in bed, your doctor may have you use a special device that inflates and deflates knee-high boots to help keep blood from pooling in your legs. · Use compression stockings if your doctor prescribes them. These are specially fitted stockings that may prevent blood clots by keeping blood from pooling in your legs. When you travel  · Take breaks when you travel. On long car trips, stop the car and walk around every hour or so. On long bus or train rides or plane flights, get out of your seat and walk up and down the aisle every hour or so. · Do leg exercises while you are seated. For example, pump your feet up and down by pulling your toes up toward your knees and then pointing them down. If you already have a risk of blood clots, talk to your doctor before taking a long trip. Your doctor may want you to wear compression stockings or take blood-thinning medicine. Take care of your body  · Be active.  Try to get 30 minutes or more of activity on most days of the week. · Don't smoke. Smoking can increase your risk of blood clots. If you need help quitting, talk to your doctor about stop-smoking programs and medicines. · Check with your doctor about whether you should use hormonal forms of birth control or hormone therapy. These may increase your risk of blood clots. When should you call for help? Call 911 anytime you think you may need emergency care. For example, call if:  · You have symptoms of a blood clot in your lung (called a pulmonary embolism). These may include:  ? Sudden chest pain. ? Trouble breathing. ? Coughing up blood. Call your doctor now or seek immediate medical care if:  · You have symptoms of a blood clot in your arm or leg (called a deep vein thrombosis). These may include:  ? Pain in the arm, calf, back of the knee, thigh, or groin. ? Redness and swelling in the arm, leg, or groin. Where can you learn more? Go to http://sujatha-danielle.info/  Enter F229 in the search box to learn more about \"Learning About How to Prevent Blood Clots. \"  Current as of: July 14, 2019Content Version: 12.4  © 3881-5520 Healthwise, Incorporated. Care instructions adapted under license by [employer] (which disclaims liability or warranty for this information). If you have questions about a medical condition or this instruction, always ask your healthcare professional. [place] [address] any warranty or liability for your use of this information. Patient Education        Substance Use Disorder: Care Instructions  Overview    You can improve your life and health by stopping your use of alcohol or drugs. When you don't drink or use drugs, you may feel and sleep better. You may get along better with your family, friends, and coworkers. There are medicines and programs that can help with substance use disorder. How can you care for yourself at home?   · If you have been given medicine to help keep you sober or reduce your cravings, be sure to take it as prescribed. · Talk to your doctor about programs that can help you stop using drugs or drinking alcohol. · If your doctor prescribes disulfiram (Antabuse), do not drink any alcohol while you are taking this medicine. You may have severe, even life-threatening, side effects from even small amounts of alcohol. · Do not tempt yourself by keeping alcohol or drugs in your home. · Learn how to say no when other people drink or use drugs. Or don't spend time with people who drink or use drugs. · Use the time and money spent on drinking or drugs to do something fun with your family or friends. Preventing a relapse  · Do not drink alcohol or use drugs at all. Using any amount of alcohol or drugs greatly increases your risk for relapse. · Seek help from organizations such as Alcoholics Anonymous, Narcotics Anonymous, or treatment facilities if you feel the need to drink alcohol or use drugs again. · Remember that recovery is a lifelong process. · Stay away from situations, friends, or places that may lead you to drink or use drugs. · Have a plan to spot and deal with relapse. Learn to recognize changes in your thinking that lead you to drink or use drugs. These are warning signs. Get help before you start to drink or use drugs again. · Get help as soon as you can if you relapse. Some people make a plan with another person that outlines what they want that person to do for them if they relapse. The plan usually includes how to handle the relapse and who to notify in case of relapse. · Don't give up. Remember that a relapse does not mean that you have failed. Use the experience to learn the triggers that lead you to drink or use drugs. Then quit again. Many people have several relapses before they are able to quit for good. Follow-up care is a key part of your treatment and safety.  Be sure to make and go to all appointments, and call your doctor if you are having problems. It's also a good idea to know your test results and keep a list of the medicines you take. When should you call for help? Call  911 anytime you think you may need emergency care. For example, call if:    · You feel you cannot stop from hurting yourself or someone else.   [facility] your doctor now or seek immediate medical care if:    · You have serious withdrawal symptoms, such as confusion, hallucinations, severe trembling, or seizures.    Watch closely for changes in your health, and be sure to contact your doctor if:    · You have a relapse.     · You need more help or support to stop. Where can you learn more? Go to http://sujathaArcadia Powerdanielle.info/  Enter H573 in the search box to learn more about \"Substance Use Disorder: Care Instructions. \"  Current as of: August 21, 2019Content Version: 12.4  © 7501-5855 [employer]. Care instructions adapted under license by [employer] (which disclaims liability or warranty for this information). If you have questions about a medical condition or this instruction, always ask your healthcare professional. [place] [address] any warranty or liability for your use of this information. Patient Education        Liver Function Tests: About These Tests  What is it? Liver function tests check how well your liver is working. Some tests measure the amount of certain enzymes in your blood to check whether the liver is damaged or inflamed. Other tests measure how well the liver can make important proteins or clear waste products from the body. Your doctor will use the test results to help look for certain conditions, such as hepatitis, cirrhosis, or gallbladder problems. Test results that are not normal do not always mean there is a problem with your liver. Your doctor can determine if there is a problem based on your results. The tests may include:  · Alkaline phosphatase (ALP).  This test measures the amount of the enzyme ALP in your blood. · Total protein. A total serum protein test measures the amounts of two major groups of proteins in your blood (albumin and globulin) and the total amount of protein. · Bilirubin. This test measures the amount of bilirubin in your blood. When bilirubin levels are high, the skin and whites of the eyes may appear yellow (jaundice). This may be caused by liver disease. · Aspartate aminotransferase (AST) and alanine aminotransferase (ALT). These tests measure the amount of the enzymes AST and ALT in your blood. (Aminotransferase is also known as a transaminase. High levels may be called transaminitis.)  Why is this test done? Liver function tests check how well your liver is working. Some tests help show whether your liver is damaged or inflamed. Your doctor may order liver function tests if you have symptoms of liver disease. These tests also may be done to see how well a treatment for liver disease is working. How can you prepare for the test?  In general, you do not need to prepare before having these tests. Your doctor may give you some specific instructions to prepare. What happens during the test?  A health professional takes a sample of your blood. What happens after the test?  You will probably be able to go home right away. When should you call for help? Watch closely for changes in your health, and be sure to contact your doctor if you have any problems. Follow-up care is a key part of your treatment and safety. Be sure to make and go to all appointments, and call your doctor if you are having problems. It's also a good idea to keep a list of the medicines you take. Ask your doctor when you can expect to have your test results. Where can you learn more? Go to http://sujatha-danielle.info/  Enter M077 in the search box to learn more about \"Liver Function Tests: About These Tests. \"  Current as of: December 8, 2019Content Version: 12.4  © 3723-2348 Healthwise, Incorporated. Care instructions adapted under license by [employer] (which disclaims liability or warranty for this information). If you have questions about a medical condition or this instruction, always ask your healthcare professional. [address] any warranty or liability for your use of this information.

## 2020-04-06 NOTE — ED PROVIDER NOTES
EMERGENCY DEPARTMENT HISTORY AND PHYSICAL EXAM    Date: 4/6/2020  Patient Name: Aminta Denis    History of Presenting Illness     Chief Complaint   Patient presents with    Ankle swelling    Hand Swelling         History Provided By: Patient    Additional History (Context):   Aminta Denis is a 34 y.o. male with PMHX heroin abuse, history of hepatitis C presents to the emergency department C/O bilateral lower extremity swelling, bilateral upper extremity swelling right x1 week. He reports that he has had similar episodes in the past, at least twice before. Patient states that he does not follow-up with a doctor for his hepatitis. Patient denies any recent drug use. States that he has not used in several months. Patient denies any IV drug use in his hands. Pt denies fever, abdominal pain, chest pain, shortness of breath, vomiting, and any other sxs or complaints. PCP: None    Current Outpatient Medications   Medication Sig Dispense Refill    rivaroxaban (Xarelto) 15 mg (42)- 20 mg (9) DsPk Take one 15 mg tablet twice a day with food for the first 21 days. Then, take one 20 mg tablet once a day with food for 9 days. 1 Dose Pack 0    OLANZapine (ZYPREXA) 15 mg tablet Take 15 mg by mouth nightly.  hydrOXYzine pamoate (VISTARIL) 100 mg capsule Take 100 mg by mouth three (3) times daily as needed for Itching.  levothyroxine (SYNTHROID) 75 mcg tablet Take  by mouth Daily (before breakfast). Indications: hypothyroidism      perphenazine (TRILAFON) 2 mg tablet Take 2 mg by mouth three (3) times daily.  lithium carbonate 300 mg capsule Take  by mouth three (3) times daily (with meals).  hydrOXYzine HCl (ATARAX) 50 mg tablet Take 1 Tab by mouth every six (6) hours as needed for Itching. 10 Tab 0    albuterol (PROVENTIL HFA, VENTOLIN HFA, PROAIR HFA) 90 mcg/actuation inhaler Take 2 Puffs by inhalation every four (4) hours as needed for Wheezing.  1 Inhaler 1    ALPRAZOLAM (XANAX PO) Take 1 mg by mouth. Past History     Past Medical History:  Past Medical History:   Diagnosis Date    Anxiety     Bipolar I disorder, current or most recent episode depressed, with psychotic features with anxious distress ([facility] [address]     Hepatitis B     Hepatitis C     Hepatitis C, chronic (HCC)     Heroin abuse ([facility] [address]     HIV (human immunodeficiency virus infection) ([facility] [address]     pt denies (4/6/2020)    Infectious disease     Psychiatric disorder     Schizoaffective disorder, depressive type with good prognostic features ([facility] [address]        Past Surgical History:  Past Surgical History:   Procedure Laterality Date    HX ANKLE FRACTURE TX         Family History:  History reviewed. No pertinent family history. Social History:  Social History     Tobacco Use    Smoking status: Current Every Day Smoker     Packs/day: 2.00    Smokeless tobacco: Never Used   Substance Use Topics    Alcohol use: Yes     Alcohol/week: 2.0 standard drinks     Types: 2 Cans of beer per week    Drug use: Yes     Types: Heroin, Cocaine, Prescription     Comment: Methadone at this time       Allergies: Allergies   Allergen Reactions    Tylenol [Acetaminophen] Other (comments)     Pt reports has Hep C cant take tylenol            Review of Systems   Review of Systems   Constitutional: Negative for chills and fever. HENT: Negative for congestion, ear pain, sinus pain and sore throat. Eyes: Negative for pain and visual disturbance. Respiratory: Negative for cough and shortness of breath. Cardiovascular: Negative for chest pain and leg swelling. Gastrointestinal: Negative for abdominal pain, constipation, diarrhea, nausea and vomiting. Genitourinary: Negative for dysuria and hematuria. Musculoskeletal: Positive for joint swelling. Negative for back pain and neck pain. Skin: Negative for pallor and rash. Neurological: Negative for dizziness, tremors, weakness, light-headedness and headaches.    All other systems reviewed and are negative. Physical Exam     Vitals:    04/06/20 0855 04/06/20 1015   BP: 101/60 119/74   Pulse: 64 68   Resp: 18 10   Temp: 98.4 °F (36.9 °C)    SpO2: 96% 97%   Weight: 95.3 kg (210 lb)    Height: 5' 7\" (1.702 m)      Physical Exam    Nursing note and vitals reviewed    Constitutional: Drowsy young male but arousable to verbal stimuli, no acute distress  Head: Normocephalic, Atraumatic  Eyes: Pupils are equal, round, and reactive to light, EOMI  Neck: Supple, non-tender  Cardiovascular: Regular rate and rhythm, no murmurs, rubs, or gallops  Chest: Normal work of breathing and chest excursion bilaterally  Lungs: Clear to ausculation bilaterally, no wheezes, no rhonchi  Abdomen: Soft, non tender, non distended, normoactive bowel sounds  Back: No evidence of trauma or deformity  Extremities: Patient has +2 nonpitting edema of his lower extremities bilaterally. It extends from his feet to his ankles. No evidence of crepitus, trauma or deformity. No streaking erythema, vesicular lesions, ulcerations or bulla. Patient also has +1 nonpitting edema of his right hand. Along with +2 nonpitting edema of his left hand. There is no overlying erythema, streaking, ulcerations or bullae. He has +2 radial and DP pulses bilaterally. Compartments are supple.   Skin: Warm and dry, normal cap refill  Neuro: Drowsy but arousable verbal stimuli, CN intact, normal speech, moving all 4 extremities freely and symmetrically         Diagnostic Study Results     Labs -     Recent Results (from the past 12 hour(s))   CBC WITH AUTOMATED DIFF    Collection Time: 04/06/20  9:35 AM   Result Value Ref Range    WBC 8.3 4.6 - 13.2 K/uL    RBC 4.91 4.70 - 5.50 M/uL    HGB 14.7 13.0 - 16.0 g/dL    HCT 42.2 36.0 - 48.0 %    MCV 85.9 74.0 - 97.0 FL    MCH 29.9 24.0 - 34.0 PG    MCHC 34.8 31.0 - 37.0 g/dL    RDW 13.1 11.6 - 14.5 %    PLATELET 361 274 - 164 K/uL    MPV 8.7 (L) 9.2 - 11.8 FL    NEUTROPHILS 71 40 - 73 %    LYMPHOCYTES 18 (L) 21 - 52 %    MONOCYTES 9 3 - 10 %    EOSINOPHILS 2 0 - 5 %    BASOPHILS 0 0 - 2 %    ABS. NEUTROPHILS 5.9 1.8 - 8.0 K/UL    ABS. LYMPHOCYTES 1.5 0.9 - 3.6 K/UL    ABS. MONOCYTES 0.7 0.05 - 1.2 K/UL    ABS. EOSINOPHILS 0.2 0.0 - 0.4 K/UL    ABS. BASOPHILS 0.0 0.0 - 0.1 K/UL    DF AUTOMATED     NT-PRO BNP    Collection Time: 04/06/20  9:35 AM   Result Value Ref Range    NT pro-BNP 63 0 - 240 PG/ML   METABOLIC PANEL, COMPREHENSIVE    Collection Time: 04/06/20  9:35 AM   Result Value Ref Range    Sodium 140 136 - 145 mmol/L    Potassium 4.3 3.5 - 5.5 mmol/L    Chloride 103 100 - 111 mmol/L    CO2 32 21 - 32 mmol/L    Anion gap 5 3.0 - 18 mmol/L    Glucose 122 (H) 74 - 99 mg/dL    BUN 10 7.0 - 18 MG/DL    Creatinine 1.02 0.6 - 1.3 MG/DL    BUN/Creatinine ratio 10 (L) 12 - 20      GFR est AA >60 >60 ml/min/1.73m2    GFR est non-AA >60 >60 ml/min/1.73m2    Calcium 8.8 8.5 - 10.1 MG/DL    Bilirubin, total 0.5 0.2 - 1.0 MG/DL    ALT (SGPT) 123 (H) 16 - 61 U/L    AST (SGOT) 106 (H) 10 - 38 U/L    Alk.  phosphatase 97 45 - 117 U/L    Protein, total 5.9 (L) 6.4 - 8.2 g/dL    Albumin 3.2 (L) 3.4 - 5.0 g/dL    Globulin 2.7 2.0 - 4.0 g/dL    A-G Ratio 1.2 0.8 - 1.7     ETHYL ALCOHOL    Collection Time: 04/06/20  9:35 AM   Result Value Ref Range    ALCOHOL(ETHYL),SERUM <3 0 - 3 MG/DL   URINALYSIS W/ RFLX MICROSCOPIC    Collection Time: 04/06/20 10:45 AM   Result Value Ref Range    Color YELLOW      Appearance CLEAR      Specific gravity 1.005 1.005 - 1.030      pH (UA) 6.0 5.0 - 8.0      Protein NEGATIVE  NEG mg/dL    Glucose NEGATIVE  NEG mg/dL    Ketone NEGATIVE  NEG mg/dL    Bilirubin NEGATIVE  NEG      Blood NEGATIVE  NEG      Urobilinogen 0.2 0.2 - 1.0 EU/dL    Nitrites NEGATIVE  NEG      Leukocyte Esterase NEGATIVE  NEG     DRUG SCREEN, URINE    Collection Time: 04/06/20 10:45 AM   Result Value Ref Range    BENZODIAZEPINES NEGATIVE  NEG      BARBITURATES NEGATIVE  NEG      THC (TH-CANNABINOL) POSITIVE (A) NEG      OPIATES NEGATIVE  NEG      PCP(PHENCYCLIDINE) NEGATIVE  NEG      COCAINE NEGATIVE  NEG      AMPHETAMINES NEGATIVE  NEG      METHADONE POSITIVE (A) NEG      HDSCOM (NOTE)        Radiologic Studies -   XR CHEST PORT   Final Result   IMPRESSION:      No acute cardiopulmonary abnormality. CT Results  (Last 48 hours)    None        CXR Results  (Last 48 hours)               04/06/20 0928  XR CHEST PORT Final result    Impression:  IMPRESSION:       No acute cardiopulmonary abnormality. Narrative:  EXAM: XR CHEST PORT       CLINICAL INDICATION/HISTORY: fluid overload   -Additional: None       COMPARISON: 12/2/2019       TECHNIQUE: Portable frontal view of the chest       _______________       FINDINGS:       SUPPORT DEVICES: None. HEART AND MEDIASTINUM: Cardiomediastinal silhouette within normal limits. LUNGS AND PLEURAL SPACES: No dense consolidation, large effusion or   pneumothorax.       _______________                   Medical Decision Making   I am the first provider for this patient. I reviewed the vital signs, available nursing notes, past medical history, past surgical history, family history and social history. Vital Signs-Reviewed the patient's vital signs. Pulse Oximetry Analysis -96 % on room air    Records Reviewed: Nursing Notes and Old Medical Records    Provider Notes:   34 y.o. male with a history of heroin abuse, hepatitis presenting with bilateral lower extremity edema and left upper extremity edema. On exam patient is drowsy but easily arousable by verbal stimuli. Noted to have +2 nonpitting edema of his lower extremities bilaterally. No overlying erythema. He also has +2 nonpitting edema of his left hand also with no overlying erythema.  +1 nonpitting of the right hand. Clinical suspicion of possible fluid overload secondary to patient's hepatitis, however will obtain Doppler ultrasound to evaluate possible DVT.   Will also obtain chest x-ray to ensure that patient is not edema, although very low suspicion. Provide the patient's history of heroin abuse, symptoms concerning for active infection of his lower or upper extremities. There is no overlying erythema concerning for cellulitis and the patient is afebrile. Although the patient denies any recent drug use, he is very drowsy and concern for intoxication. Will check UDS and alcohol levels. Procedures:  Procedures    ED Course:   9:13 AM  Initial assessment performed. The patients presenting problems have been discussed, and they are in agreement with the care plan formulated and outlined with them. I have encouraged them to ask questions as they arise throughout their visit. 11:24 AM  Patient's UA positive for THC and methamphetamine. Mild transaminitis on CMP. BNP within normal limits and chest x-ray showing a fluid overload. Patient is positive for DVT in the subclavian vein on the left. Patient denying any chest pain or shortness of breath. Saturating 97%, not tachycardic, no signs or symptoms consistent with a PE. Patient will be started on the Xarelto starter pack. However as the patient does have a history of drug abuse, concern for noncompliance on the anticoagulation. Will consult  to help establish PCP follow-up. Diagnosis and Disposition       DISCHARGE NOTE:  11:45 AM    Myrna Kramer's  results have been reviewed with him. He has been counseled regarding his diagnosis, treatment, and plan. He verbally conveys understanding and agreement of the signs, symptoms, diagnosis, treatment and prognosis and additionally agrees to follow up as discussed. He also agrees with the care-plan and conveys that all of his questions have been answered.   I have also provided discharge instructions for him that include: educational information regarding their diagnosis and treatment, and list of reasons why they would want to return to the ED prior to their follow-up appointment, should his condition change. He has been provided with education for proper emergency department utilization. CLINICAL IMPRESSION:    1. Polysubstance abuse ([address]    2. Acute deep vein thrombosis (DVT) of other vein of left upper extremity (HCC)    3. Transaminitis        PLAN:  1. D/C Home  2. Current Discharge Medication List      START taking these medications    Details   rivaroxaban (Xarelto) 15 mg (42)- 20 mg (9) DsPk Take one 15 mg tablet twice a day with food for the first 21 days. Then, take one 20 mg tablet once a day with food for 9 days. Qty: 1 Dose Pack, Refills: 0           3. Follow-up Information     Follow up With Specialties Details Why Contact Info    Lexii Marinelli MD Gastroenterology Schedule an appointment as soon as possible for a visit in 2 days  [address] [facility]      Franki Castro MD Hematology and Oncology, Hematology, Oncology Schedule an appointment as soon as possible for a visit in 2 days  [address] [address]      [id number] [facility]  On 4/21/2020  [address] [address]  [facility] [address]  [phone]        ____________________________________     Please note that this dictation was completed with [employer], the computer voice recognition software. Quite often unanticipated grammatical, syntax, homophones, and other interpretive errors are inadvertently transcribed by the computer software. Please disregard these errors. Please excuse any errors that have escaped final proofreading.

## 2020-04-06 NOTE — ED TRIAGE NOTES
Patient with LEFT hand and DURGA ankle swollen. Seen at [facility] and reports \"they didn't do anything for me. \" Denies injury/trauma.      PMHx Hep C

## 2020-04-07 ENCOUNTER — PATIENT OUTREACH (OUTPATIENT)
Dept: CASE MANAGEMENT | Age: 30
End: 2020-04-07

## 2020-04-07 NOTE — PROGRESS NOTES
Patient contacted regarding recent discharge and COVID-19 risk   Care Transition Nurse/ Ambulatory Care Manager contacted the patient by telephone to perform post discharge assessment. Verified name and  with patient as identifiers. Patient has following risk factors of: immunocompromised. CTN/ACM reviewed discharge instructions, medical action plan and red flags related to discharge diagnosis. Reviewed and educated them on any new and changed medications related to discharge diagnosis. Advised obtaining a 90-day supply of all daily and as-needed medications. Education provided regarding infection prevention, and signs and symptoms of COVID-19 and when to seek medical attention with patient who verbalized understanding. Discussed exposure protocols and quarantine from [address] you at higher risk for severe illness  and given an opportunity for questions and concerns. The patient agrees to contact the COVID-19 hotline [phone] or PCP office for questions related to their healthcare. CTN/ACM provided contact information for future reference. From CDC: Are you at higher risk for severe illness?  Wash your hands often.  Avoid close contact (6 feet, which is about two arm lengths) with people who are sick.  Put distance between yourself and other people if COVID-19 is spreading in your community.  Clean and disinfect frequently touched surfaces.  Avoid all cruise travel and non-essential air travel.  Call your healthcare professional if you have concerns about COVID-19 and your underlying condition or if you are sick.     For more information on steps you can take to protect yourself, see CDC's How to Protect Yourself     Patient/family/caregiver given information for [facility] and agrees to enroll no    Plan for follow-up call in 5-7 days based on severity of symptoms and risk factors

## 2020-04-13 ENCOUNTER — HOSPITAL ENCOUNTER (EMERGENCY)
Age: 30
Discharge: HOME | End: 2020-04-13
Attending: EMERGENCY MEDICINE
Payer: MEDICAID

## 2020-04-13 VITALS
WEIGHT: 205 LBS | TEMPERATURE: 97.2 F | HEART RATE: 99 BPM | HEIGHT: 66 IN | RESPIRATION RATE: 16 BRPM | OXYGEN SATURATION: 100 % | DIASTOLIC BLOOD PRESSURE: 95 MMHG | BODY MASS INDEX: 32.95 KG/M2 | SYSTOLIC BLOOD PRESSURE: 151 MMHG

## 2020-04-13 DIAGNOSIS — F41.1 ANXIETY STATE: Primary | ICD-10-CM

## 2020-04-13 PROCEDURE — 74011250637 HC RX REV CODE- 250/637: Performed by: EMERGENCY MEDICINE

## 2020-04-13 PROCEDURE — 99283 EMERGENCY DEPT VISIT LOW MDM: CPT

## 2020-04-13 RX ORDER — HYDROXYZINE 25 MG/1
50 TABLET, FILM COATED ORAL
Status: COMPLETED | OUTPATIENT
Start: 2020-04-13 | End: 2020-04-13

## 2020-04-13 RX ADMIN — HYDROXYZINE HYDROCHLORIDE 50 MG: 25 TABLET, FILM COATED ORAL at 12:56

## 2020-04-13 NOTE — DISCHARGE INSTRUCTIONS
You were seen and evaluated in the Emergency Department. Please understand that your work up is not all encompassing and you should follow up with your primary care physician for further management and continuity of care. Please make sure that you  your prescription for Vistaril immediately so you may take it as needed for your anxiety. Please also take your Xarelto as prescribed. Please return to Emergency Department or seek medical attention immediately if you have acute worsening in your symptoms or develop chest pain, shortness of breath, repeated vomiting, fever, altered level of consciousness, coughing up blood, or start sweating and feel clammy. If you were prescribed any medicine for home, please take as prescribed by your health-care provider. If you were given any follow-up appointments or numbers to call, please do so as instructed. Avoid any tobacco products or excessive alcohol.

## 2020-04-13 NOTE — ED PROVIDER NOTES
EMERGENCY DEPARTMENT HISTORY AND PHYSICAL EXAM    Date: 4/13/2020  Patient Name: Aminta Denis    History of Presenting Illness     Chief Complaint   Patient presents with    Anxiety         History Provided By: Patient    Additional History (Context):   Aminta Denis is a 34 y.o. male with PMHX DVT on Xarelto presents to the emergency department C/O feeling anxious. Patient reports \"they usually give me Valium and Vistaril and it makes me feel better\". Pt denies SI or HI, and any other sxs or complaints. Was prescribed Vistaril yesterday however states that he did not  the prescription. PCP: None    Current Facility-Administered Medications   Medication Dose Route Frequency Provider Last Rate Last Dose    hydrOXYzine HCL (ATARAX) tablet 50 mg  50 mg Oral NOW Margaux Carrasquillo,          Current Outpatient Medications   Medication Sig Dispense Refill    rivaroxaban (Xarelto) 15 mg (42)- 20 mg (9) DsPk Take one 15 mg tablet twice a day with food for the first 21 days. Then, take one 20 mg tablet once a day with food for 9 days. 1 Dose Pack 0    perphenazine (TRILAFON) 2 mg tablet Take 2 mg by mouth three (3) times daily.  OLANZapine (ZYPREXA) 15 mg tablet Take 15 mg by mouth nightly.  lithium carbonate 300 mg capsule Take  by mouth three (3) times daily (with meals).  hydrOXYzine pamoate (VISTARIL) 100 mg capsule Take 100 mg by mouth three (3) times daily as needed for Itching.  levothyroxine (SYNTHROID) 75 mcg tablet Take  by mouth Daily (before breakfast). Indications: hypothyroidism      hydrOXYzine HCl (ATARAX) 50 mg tablet Take 1 Tab by mouth every six (6) hours as needed for Itching. 10 Tab 0    albuterol (PROVENTIL HFA, VENTOLIN HFA, PROAIR HFA) 90 mcg/actuation inhaler Take 2 Puffs by inhalation every four (4) hours as needed for Wheezing. 1 Inhaler 1    ALPRAZOLAM (XANAX PO) Take 1 mg by mouth.          Past History     Past Medical History:  Past Medical History:   Diagnosis Date    Anxiety     Bipolar I disorder, current or most recent episode depressed, with psychotic features with anxious distress (HCC)     Hepatitis B     Hepatitis C     Hepatitis C, chronic (HCC)     Heroin abuse ([facility] [address]     HIV (human immunodeficiency virus infection) ([facility] [address]     pt denies (4/6/2020)    Infectious disease     Psychiatric disorder     Schizoaffective disorder, depressive type with good prognostic features ([facility] [address]        Past Surgical History:  Past Surgical History:   Procedure Laterality Date    HX ANKLE FRACTURE TX         Family History:  History reviewed. No pertinent family history. Social History:  Social History     Tobacco Use    Smoking status: Current Every Day Smoker     Packs/day: 2.00    Smokeless tobacco: Never Used   Substance Use Topics    Alcohol use: Yes     Alcohol/week: 2.0 standard drinks     Types: 2 Cans of beer per week    Drug use: Yes     Types: Heroin, Cocaine, Prescription     Comment: Methadone at this time       Allergies: Allergies   Allergen Reactions    Tylenol [Acetaminophen] Other (comments)     Pt reports has Hep C cant take tylenol            Review of Systems   Review of Systems   Constitutional: Negative for fatigue and fever. Respiratory: Negative for chest tightness and shortness of breath. Cardiovascular: Negative for chest pain and palpitations. Gastrointestinal: Negative for nausea and vomiting. Genitourinary: Negative for difficulty urinating and hematuria. Psychiatric/Behavioral: Negative for suicidal ideas. The patient is nervous/anxious.         Physical Exam     Vitals:    04/13/20 1235   BP: (!) 151/95   Pulse: 99   Resp: 16   Temp: 97.2 °F (36.2 °C)   SpO2: 100%   Weight: 93 kg (205 lb)   Height: 5' 6\" (1.676 m)     Physical Exam    Nursing note and vitals reviewed    Constitutional: Anxious appearing young  male, pacing  Head: Normocephalic, Atraumatic  Eyes: Pupils are equal, round, and reactive to light, EOMI  Neck: Supple, non-tender  Chest: Normal work of breathing and chest excursion bilaterally  Back: No evidence of trauma or deformity  Extremities: Left upper extremity hand and forearm swelling. no streaking erythema, vesicular lesions, ulcerations or bulla  Skin: Warm and dry, normal cap refill  Neuro: Alert and appropriate, CN intact, normal speech, moving all 4 extremities freely and symmetrically  Psychiatric: Anxious and pacing       Diagnostic Study Results     Labs -   No results found for this or any previous visit (from the past 12 hour(s)). Radiologic Studies -   No orders to display     CT Results  (Last 48 hours)    None        CXR Results  (Last 48 hours)    None            Medical Decision Making   I am the first provider for this patient. I reviewed the vital signs, available nursing notes, past medical history, past surgical history, family history and social history. Vital Signs-Reviewed the patient's vital signs. Pulse Oximetry Analysis -100 % on room air    Records Reviewed: Nursing Notes and Old Medical Records    Provider Notes:   34 y.o. male presenting complaining of anxiety. On exam patient is anxious in appearance. He is pacing the room. However does not appear toxic. He has appropriate vital signs. In reviewing patient's medical history he was just seen yesterday at an outlying emergency department complaining of shortness of breath. Due to his history of DVT on Xarelto, patient had a CTA of his chest negative for pulmonary embolism. Patient denying any worsening symptoms. He denies any SI or HI. After being evaluated in the emergency department yesterday, patient was prescribed Vistaril for his anxiety. However patient states that he did not  his prescriptions. Will give a dose of Vistaril in the emergency department for the patient. Patient urged to  his prescription.   Patient seen in this facility and diagnosed with a DVT on April 6. Schedule a follow-up appointment with the [facility] clinic on April 21. Procedures:  Procedures    ED Course:   12:33 PM   Initial assessment performed. The patients presenting problems have been discussed, and they are in agreement with the care plan formulated and outlined with them. I have encouraged them to ask questions as they arise throughout their visit. Diagnosis and Disposition       DISCHARGE NOTE:  12:50 PM    Myrna Kramer's  results have been reviewed with him. He has been counseled regarding his diagnosis, treatment, and plan. He verbally conveys understanding and agreement of the signs, symptoms, diagnosis, treatment and prognosis and additionally agrees to follow up as discussed. He also agrees with the care-plan and conveys that all of his questions have been answered. I have also provided discharge instructions for him that include: educational information regarding their diagnosis and treatment, and list of reasons why they would want to return to the ED prior to their follow-up appointment, should his condition change. He has been provided with education for proper emergency department utilization. CLINICAL IMPRESSION:    1. Anxiety state        PLAN:  1. D/C Home  2. Current Discharge Medication List        3. Follow-up Information     Follow up With Specialties Details Why Contact Info    [address] [place]  Schedule an appointment as soon as possible for a visit in 2 days  [address] [facility], [address] [facility] [address]    [facility] Emergency Medicine  As needed if symptoms worsen [address] [address]  [phone]        ____________________________________     Please note that this dictation was completed with [employer], the [employer] voice recognition software.   Quite often unanticipated grammatical, syntax, homophones, and other interpretive errors are inadvertently transcribed by the computer software. Please disregard these errors. Please excuse any errors that have escaped final proofreading.

## 2020-04-13 NOTE — ED TRIAGE NOTES
Pt to ED for anxiety x 20-30min, pt reports being seen at [facility] facility yesterday and told there was no change in position of DVT in LUE after CTA of chest.  Pt reports he is having dyspnea, but speaking in full sentences, with regular rate of breathing, breathing appears unlabored, reports cough (but states this is baseline for him), has even chest rise, w/ no cyanosis noted during initial triage. Pt reports he usually needs valium or ativan to help with anxiety, pt asking how long before being seen by ER MD, pt instructed that MD would come to room when available.

## 2020-04-14 ENCOUNTER — PATIENT OUTREACH (OUTPATIENT)
Dept: CASE MANAGEMENT | Age: 30
End: 2020-04-14

## 2020-04-14 NOTE — PROGRESS NOTES
Patient contacted regarding recent discharge and COVID-19 risk . Patient has been to the [facility] department twice this week for panic attacks. When called patient was at pharmacy getting his visteril filled  Care Transition Nurse/ Ambulatory Care Manager contacted the patient by telephone to perform post discharge assessment. Verified name and  with patient as identifiers. Patient has following risk factors of: CHF, immunocompromised. CTN/ACM reviewed discharge instructions, medical action plan and red flags related to discharge diagnosis. Reviewed and educated them on any new and changed medications related to discharge diagnosis. Advised obtaining a 90-day supply of all daily and as-needed medications. Education provided regarding infection prevention, and signs and symptoms of COVID-19 and when to seek medical attention with patient who verbalized understanding. Discussed exposure protocols and quarantine from [address] you at higher risk for severe illness  and given an opportunity for questions and concerns. The patient agrees to contact the COVID-19 hotline [phone] or PCP office for questions related to their healthcare. CTN/ACM provided contact information for future reference. From CDC: Are you at higher risk for severe illness?  Wash your hands often.  Avoid close contact (6 feet, which is about two arm lengths) with people who are sick.  Put distance between yourself and other people if COVID-19 is spreading in your community.  Clean and disinfect frequently touched surfaces.  Avoid all cruise travel and non-essential air travel.  Call your healthcare professional if you have concerns about COVID-19 and your underlying condition or if you are sick.     For more information on steps you can take to protect yourself, see CDC's How to Protect Yourself     Patient/family/caregiver given information for [facility] and agrees to enroll no    Plan for follow-up call in 5-7 days based on severity of symptoms and risk factors

## 2020-04-19 ENCOUNTER — HOSPITAL ENCOUNTER (EMERGENCY)
Age: 30
Discharge: HOME | End: 2020-04-19
Attending: EMERGENCY MEDICINE
Payer: MEDICAID

## 2020-04-19 ENCOUNTER — APPOINTMENT (OUTPATIENT)
Dept: GENERAL RADIOLOGY | Age: 30
End: 2020-04-19
Attending: EMERGENCY MEDICINE
Payer: MEDICAID

## 2020-04-19 VITALS
OXYGEN SATURATION: 97 % | SYSTOLIC BLOOD PRESSURE: 122 MMHG | TEMPERATURE: 99 F | HEIGHT: 66 IN | BODY MASS INDEX: 32.95 KG/M2 | RESPIRATION RATE: 18 BRPM | DIASTOLIC BLOOD PRESSURE: 57 MMHG | WEIGHT: 205 LBS | HEART RATE: 93 BPM

## 2020-04-19 DIAGNOSIS — F41.1 ANXIETY STATE: Primary | ICD-10-CM

## 2020-04-19 DIAGNOSIS — Z86.718 HISTORY OF DVT (DEEP VEIN THROMBOSIS): ICD-10-CM

## 2020-04-19 LAB
ATRIAL RATE: 98 BPM
CALCULATED P AXIS, ECG09: 50 DEGREES
CALCULATED R AXIS, ECG10: 42 DEGREES
CALCULATED T AXIS, ECG11: 27 DEGREES
DIAGNOSIS, 93000: NORMAL
P-R INTERVAL, ECG05: 134 MS
Q-T INTERVAL, ECG07: 340 MS
QRS DURATION, ECG06: 84 MS
QTC CALCULATION (BEZET), ECG08: 434 MS
VENTRICULAR RATE, ECG03: 98 BPM

## 2020-04-19 PROCEDURE — 99285 EMERGENCY DEPT VISIT HI MDM: CPT

## 2020-04-19 PROCEDURE — 74011250637 HC RX REV CODE- 250/637: Performed by: EMERGENCY MEDICINE

## 2020-04-19 PROCEDURE — 93005 ELECTROCARDIOGRAM TRACING: CPT

## 2020-04-19 PROCEDURE — 71045 X-RAY EXAM CHEST 1 VIEW: CPT

## 2020-04-19 RX ORDER — LORAZEPAM 1 MG/1
1 TABLET ORAL ONCE
Status: COMPLETED | OUTPATIENT
Start: 2020-04-19 | End: 2020-04-19

## 2020-04-19 RX ADMIN — LORAZEPAM 1 MG: 1 TABLET ORAL at 10:57

## 2020-04-19 NOTE — ED TRIAGE NOTES
Pt arrives ambulatory to ED with c\o SOB, pt sts \"I just want to make sure that this blood clot in my left arm hasnt moved, I have been short of breath, and the lady at the methadone clinic said I looked pale, and it's giving me anxiety\", pt is able to make needs known speaking in complete sentences, pt in nad at this time

## 2020-04-19 NOTE — ED NOTES
Pt reports increased anxiety X2 weeks. He reports that he found out 2 weeks ago that he has a blood clot in his left arm, he sts that he is taking his blood thinners, however, he has been more anxious. He sts that he has had anxiety prior.

## 2020-04-19 NOTE — ED PROVIDER NOTES
EMERGENCY DEPARTMENT HISTORY AND PHYSICAL EXAM    Date: 4/19/2020  Patient Name: Gerald Hummel    History of Presenting Illness     Chief Complaint   Patient presents with    Dizziness    Shortness of Breath         History Provided By: Patient    [address] is a 34 y.o. male with PMHX of anxiety , bipolar, HIV, HCV, who presents to the emergency department C/O anxiety reaction. Patient reports he is at his methadone clinic today and he was told he was looking pale and clammy he should go to the ER. He reports feeling anxious, short of breath, and having numbness that went from his lower extremities up through his body. He states this feels like his typical anxiety reaction. Reports that he is been prescribed Vistaril however does not work. Patient has a history of IV drug abuse for several years stop using back in December. Was recently diagnosed with left upper extremity DVT. He is currently on Xarelto starter pack and had a hematology appointment with Dr. Nikolay Messina. PCP: None    Current Outpatient Medications   Medication Sig Dispense Refill    rivaroxaban (Xarelto) 15 mg (42)- 20 mg (9) DsPk Take one 15 mg tablet twice a day with food for the first 21 days. Then, take one 20 mg tablet once a day with food for 9 days. 1 Dose Pack 0    perphenazine (TRILAFON) 2 mg tablet Take 2 mg by mouth three (3) times daily.  OLANZapine (ZYPREXA) 15 mg tablet Take 15 mg by mouth nightly.  lithium carbonate 300 mg capsule Take  by mouth three (3) times daily (with meals).  hydrOXYzine pamoate (VISTARIL) 100 mg capsule Take 100 mg by mouth three (3) times daily as needed for Itching.  levothyroxine (SYNTHROID) 75 mcg tablet Take  by mouth Daily (before breakfast). Indications: hypothyroidism      hydrOXYzine HCl (ATARAX) 50 mg tablet Take 1 Tab by mouth every six (6) hours as needed for Itching.  10 Tab 0    albuterol (PROVENTIL HFA, VENTOLIN HFA, PROAIR HFA) 90 mcg/actuation inhaler Take 2 Puffs by inhalation every four (4) hours as needed for Wheezing. 1 Inhaler 1    ALPRAZOLAM (XANAX PO) Take 1 mg by mouth. Past History     Past Medical History:  Past Medical History:   Diagnosis Date    Anxiety     Bipolar I disorder, current or most recent episode depressed, with psychotic features with anxious distress ([facility] [address]     Hepatitis B     Hepatitis C     Hepatitis C, chronic (HCC)     Heroin abuse ([facility] [address]     HIV (human immunodeficiency virus infection) ([facility] [address]     pt denies (4/6/2020)    Infectious disease     Psychiatric disorder     Schizoaffective disorder, depressive type with good prognostic features ([facility] [address]        Past Surgical History:  Past Surgical History:   Procedure Laterality Date    HX ANKLE FRACTURE TX         Family History:  History reviewed. No pertinent family history. Social History:  Social History     Tobacco Use    Smoking status: Current Every Day Smoker     Packs/day: 2.00    Smokeless tobacco: Never Used   Substance Use Topics    Alcohol use: Yes     Alcohol/week: 2.0 standard drinks     Types: 2 Cans of beer per week    Drug use: Yes     Types: Heroin, Cocaine, Prescription     Comment: Methadone at this time       Allergies: Allergies   Allergen Reactions    Tylenol [Acetaminophen] Other (comments)     Pt reports has Hep C cant take tylenol            Review of Systems   Review of Systems   Constitutional: Negative for chills and fever. Respiratory: Positive for shortness of breath. Negative for cough, choking and chest tightness. Neurological: Positive for numbness. Psychiatric/Behavioral: Positive for decreased concentration, dysphoric mood and sleep disturbance. Negative for self-injury and suicidal ideas. The patient is nervous/anxious.           Physical Exam     Vitals:    04/19/20 0952 04/19/20 1057 04/19/20 1058 04/19/20 1100   BP: 126/78 121/61 121/61 122/57   Pulse: (!) 119  93    Resp: 19  18    Temp: 99 °F (37.2 °C)      SpO2: 100% 95% 97% 97%   Weight: 93 kg (205 lb)      Height: 5' 6\" (1.676 m)        Physical Exam  Vitals signs and nursing note reviewed. Constitutional:       General: He is not in acute distress. Appearance: He is well-developed. HENT:      Head: Normocephalic and atraumatic. Eyes:      Conjunctiva/sclera: Conjunctivae normal.      Pupils: Pupils are equal, round, and reactive to light. Neck:      Musculoskeletal: Normal range of motion and neck supple. Cardiovascular:      Rate and Rhythm: Normal rate and regular rhythm. Heart sounds: Normal heart sounds. Pulmonary:      Effort: Pulmonary effort is normal. No respiratory distress. Breath sounds: Normal breath sounds. No wheezing or rales. Chest:      Chest wall: No tenderness. Abdominal:      General: There is no distension. Palpations: Abdomen is soft. Tenderness: There is no abdominal tenderness. There is no guarding or rebound. Musculoskeletal: Normal range of motion. General: No tenderness. Lymphadenopathy:      Cervical: No cervical adenopathy. Skin:     General: Skin is warm and dry. Neurological:      General: No focal deficit present. Mental Status: He is alert and oriented to person, place, and time. Cranial Nerves: No cranial nerve deficit. Motor: No abnormal muscle tone. Coordination: Coordination normal.   Psychiatric:         Mood and Affect: Mood is anxious.          Behavior: Behavior normal.           Diagnostic Study Results     Labs -     Recent Results (from the past 12 hour(s))   EKG, 12 LEAD, INITIAL    Collection Time: 04/19/20 10:26 AM   Result Value Ref Range    Ventricular Rate 98 BPM    Atrial Rate 98 BPM    P-R Interval 134 ms    QRS Duration 84 ms    Q-T Interval 340 ms    QTC Calculation (Bezet) 434 ms    Calculated P Axis 50 degrees    Calculated R Axis 42 degrees    Calculated T Axis 27 degrees    Diagnosis       Normal sinus rhythm  Normal ECG  When compared with ECG of 28-NOV-2019 18:25,  No significant change was found         Radiologic Studies -   XR CHEST PORT    (Results Pending)     CT Results  (Last 48 hours)    None        CXR Results  (Last 48 hours)    None          Medications given in the ED-  Medications   LORazepam (ATIVAN) tablet 1 mg (1 mg Oral Given 4/19/20 1057)         Medical Decision Making   I am the first provider for this patient. I reviewed the vital signs, available nursing notes, past medical history, past surgical history, family history and social history. Vital Signs-Reviewed the patient's vital signs. Pulse Oximetry Analysis - 100% on RA     Cardiac Monitor:  Rate: 97 bpm  Rhythm: NSR    EKG interpretation: (Preliminary)  EKG read by Dr. Teresa Wiley at 1076   Sinus rhythm rate of 98, normal intervals, normal axis, normal EKG    Records Reviewed: Nursing Notes and Old Medical Records    Provider Notes (Medical Decision Making): Farzana Castillo is a 34 y.o. male presents with symptoms of a typical anxiety reaction. Patient seen in this emergency department and [facility] for similar complaint. He had a negative CTA of his chest on 4/12, 5 days ago. Reports this feels like his typical anxiety type of reaction. He also reports compliance to Xarelto. I am not concern for pulmonary embolism because o this. Initial tachycardia secondary to patient anxiety. On arrival he was pacing around his room and was not comfortable unless we left the door open. He reports no fever, chills, chest pain or other concerning symptoms of septic emboli. His chest x-ray shows no acute findings. He says last IVDA was in 12/2019. I have placed a case management consult and for patient to refer him to primary care and [facility]. Procedures:  Procedures    ED Course:       Diagnosis and Disposition     Critical Care:     DISCHARGE NOTE:    Ankur Kramer's  results have been reviewed with him.   He has been counseled regarding his diagnosis, treatment, and plan.  He verbally conveys understanding and agreement of the signs, symptoms, diagnosis, treatment and prognosis and additionally agrees to follow up as discussed. He also agrees with the care-plan and conveys that all of his questions have been answered. I have also provided discharge instructions for him that include: educational information regarding their diagnosis and treatment, and list of reasons why they would want to return to the ED prior to their follow-up appointment, should his condition change. He has been provided with education for proper emergency department utilization. CLINICAL IMPRESSION:    1. Anxiety state    2. History of DVT (deep vein thrombosis)        PLAN:  1. D/C Home  2. Current Discharge Medication List        3. Follow-up Information     Follow up With Specialties Details Why Contact Info    [facility]  Schedule an appointment as soon as possible for a visit  For primary care follow up [address] [facility], [address] [facility] [address] [facility] [address]  Schedule an appointment as soon as possible for a visit   [address] [facility], [address] [facility] [facility]  [phone]        _______________________________      Please note that this dictation was completed with [employer], the computer voice recognition software. Quite often unanticipated grammatical, syntax, homophones, and other interpretive errors are inadvertently transcribed by the computer software. Please disregard these errors. Please excuse any errors that have escaped final proofreading.

## 2020-04-20 ENCOUNTER — PATIENT OUTREACH (OUTPATIENT)
Dept: CASE MANAGEMENT | Age: 30
End: 2020-04-20

## 2020-04-20 NOTE — PROGRESS NOTES
Patient contacted regarding recent discharge and COVID-19 risk   Care Transition Nurse/ Ambulatory Care Manager contacted the patient by telephone to perform post discharge assessment. Patient reports having anxiety symptoms Verified name and  with patient as identifiers. Patient has following risk factors of: heart failure and immunocompromised. CTN/ACM reviewed discharge instructions, medical action plan and red flags related to discharge diagnosis. Reviewed and educated them on any new and changed medications related to discharge diagnosis. Advised obtaining a 90-day supply of all daily and as-needed medications. Education provided regarding infection prevention, and signs and symptoms of COVID-19 and when to seek medical attention with patient who verbalized understanding. Discussed exposure protocols and quarantine from [address] you at higher risk for severe illness  and given an opportunity for questions and concerns. The patient agrees to contact the COVID-19 hotline [phone] or PCP office for questions related to their healthcare. CTN/ACM provided contact information for future reference. From CDC: Are you at higher risk for severe illness?  Wash your hands often.  Avoid close contact (6 feet, which is about two arm lengths) with people who are sick.  Put distance between yourself and other people if COVID-19 is spreading in your community.  Clean and disinfect frequently touched surfaces.  Avoid all cruise travel and non-essential air travel.  Call your healthcare professional if you have concerns about COVID-19 and your underlying condition or if you are sick. For more information on steps you can take to protect yourself, see CDC's How to Protect Yourself      Patient/family/caregiver given information for [facility] [employer] and agrees to enroll no      Plan for follow-up call in 3-5 days based on severity of symptoms and risk factors.

## 2020-04-24 ENCOUNTER — HOSPITAL ENCOUNTER (EMERGENCY)
Age: 30
Discharge: HOME | End: 2020-04-24
Attending: EMERGENCY MEDICINE | Admitting: EMERGENCY MEDICINE
Payer: MEDICAID

## 2020-04-24 VITALS
HEART RATE: 90 BPM | DIASTOLIC BLOOD PRESSURE: 76 MMHG | WEIGHT: 205 LBS | SYSTOLIC BLOOD PRESSURE: 133 MMHG | TEMPERATURE: 98.6 F | HEIGHT: 66 IN | BODY MASS INDEX: 32.95 KG/M2 | OXYGEN SATURATION: 100 % | RESPIRATION RATE: 18 BRPM

## 2020-04-24 DIAGNOSIS — F31.9 BIPOLAR 1 DISORDER (HCC): ICD-10-CM

## 2020-04-24 DIAGNOSIS — F41.1 ANXIETY STATE: Primary | ICD-10-CM

## 2020-04-24 DIAGNOSIS — R07.89 ATYPICAL CHEST PAIN: ICD-10-CM

## 2020-04-24 LAB
ATRIAL RATE: 98 BPM
CALCULATED P AXIS, ECG09: 48 DEGREES
CALCULATED R AXIS, ECG10: 35 DEGREES
CALCULATED T AXIS, ECG11: 10 DEGREES
DIAGNOSIS, 93000: NORMAL
P-R INTERVAL, ECG05: 146 MS
Q-T INTERVAL, ECG07: 356 MS
QRS DURATION, ECG06: 80 MS
QTC CALCULATION (BEZET), ECG08: 454 MS
VENTRICULAR RATE, ECG03: 98 BPM

## 2020-04-24 PROCEDURE — 93005 ELECTROCARDIOGRAM TRACING: CPT

## 2020-04-24 PROCEDURE — 74011250637 HC RX REV CODE- 250/637: Performed by: EMERGENCY MEDICINE

## 2020-04-24 PROCEDURE — 99283 EMERGENCY DEPT VISIT LOW MDM: CPT

## 2020-04-24 RX ORDER — LORAZEPAM 1 MG/1
1 TABLET ORAL
Status: COMPLETED | OUTPATIENT
Start: 2020-04-24 | End: 2020-04-24

## 2020-04-24 RX ADMIN — LORAZEPAM 1 MG: 1 TABLET ORAL at 17:26

## 2020-04-24 NOTE — ED PROVIDER NOTES
EMERGENCY DEPARTMENT HISTORY AND PHYSICAL EXAM    Date: 4/24/2020  Patient Name: Anamaria Smith    History of Presenting Illness     Chief Complaint   Patient presents with    Chest Pain         History Provided By: Patient    5:14 PM  Anamaria Smith is a 34 y.o. male with PMHX of extremity DVT on Xarelto, anxiety who presents to the emergency department C/O diarrhea tach. Patient states just prior to arrival he started feeling anxious with palpitations, shortness of breath, chest tightness similar to prior anxiety attacks. He reports he is on Vistaril for this but is not helping. He reports that he is having these panic attacks more frequently and has been calling [facility] to try to get psychiatry follow-up but that they are not answering or returning his calls. He denies SI, HI, fever, cough, lower extremity edema, abdominal pain, nausea, vomiting, sick contacts, recent travel, other complaints. He reports in the past he is come to the emergency department and received Ativan which is relieved his symptoms. PCP: None    Current Outpatient Medications   Medication Sig Dispense Refill    rivaroxaban (Xarelto) 15 mg (42)- 20 mg (9) DsPk Take one 15 mg tablet twice a day with food for the first 21 days. Then, take one 20 mg tablet once a day with food for 9 days. 1 Dose Pack 0    perphenazine (TRILAFON) 2 mg tablet Take 2 mg by mouth three (3) times daily.  OLANZapine (ZYPREXA) 15 mg tablet Take 15 mg by mouth nightly.  lithium carbonate 300 mg capsule Take  by mouth three (3) times daily (with meals).  hydrOXYzine pamoate (VISTARIL) 100 mg capsule Take 100 mg by mouth three (3) times daily as needed for Itching.  levothyroxine (SYNTHROID) 75 mcg tablet Take  by mouth Daily (before breakfast). Indications: hypothyroidism      hydrOXYzine HCl (ATARAX) 50 mg tablet Take 1 Tab by mouth every six (6) hours as needed for Itching.  10 Tab 0    albuterol (PROVENTIL HFA, VENTOLIN HFA, PROAIR HFA) 90 mcg/actuation inhaler Take 2 Puffs by inhalation every four (4) hours as needed for Wheezing. 1 Inhaler 1    ALPRAZOLAM (XANAX PO) Take 1 mg by mouth. Past History     Past Medical History:  Past Medical History:   Diagnosis Date    Anxiety     Bipolar I disorder, current or most recent episode depressed, with psychotic features with anxious distress ([facility]     Hepatitis B     Hepatitis C     Hepatitis C, chronic (HCC)     Heroin abuse ([facility]     HIV (human immunodeficiency virus infection) ([facility]     pt denies (4/6/2020)    Infectious disease     Psychiatric disorder     Schizoaffective disorder, depressive type with good prognostic features ([facility]        Past Surgical History:  Past Surgical History:   Procedure Laterality Date    HX ANKLE FRACTURE TX         Family History:  History reviewed. No pertinent family history. Social History:  Social History     Tobacco Use    Smoking status: Current Every Day Smoker     Packs/day: 2.00    Smokeless tobacco: Never Used   Substance Use Topics    Alcohol use: Yes     Alcohol/week: 2.0 standard drinks     Types: 2 Cans of beer per week    Drug use: Yes     Types: Heroin, Cocaine, Prescription     Comment: Methadone at this time       Allergies: Allergies   Allergen Reactions    Tylenol [Acetaminophen] Other (comments)     Pt reports has Hep C cant take tylenol            Review of Systems   Review of Systems   Constitutional: Negative for fever. Respiratory: Positive for chest tightness and shortness of breath. Cardiovascular: Positive for palpitations. Gastrointestinal: Negative for abdominal pain. Psychiatric/Behavioral: The patient is nervous/anxious. All other systems reviewed and are negative.         Physical Exam     Vitals:    04/24/20 1708   BP: 147/80   Pulse: (!) 106   Resp: 14   Temp: 98.6 °F (37 °C)   SpO2: 100%   Weight: 93 kg (205 lb)   Height: 5' 6\" (1.676 m)     Physical Exam    Nursing notes and vital signs reviewed    Constitutional: Non toxic appearing, disheveled, pacing the room, moderate distress  Head: Normocephalic, Atraumatic  Eyes: EOMI  Neck: Supple  Cardiovascular: Mildly tachycardic and regular rhythm, no murmurs, rubs, or gallops  Chest: Normal work of breathing and chest excursion bilaterally  Lungs: Clear to ausculation bilaterally  Back: No evidence of trauma or deformity  Extremities: Edema of left upper extremity that patient reports is at baseline since his diagnosis of DVT, no LE edema  Skin: Warm and dry, normal cap refill  Neuro: Alert and appropriate, CN intact, normal speech, strength and sensation full and symmetric bilaterally, normal gait, normal coordination   Psychiatric: Anxious and agitated mood and affect, eye contact, no SI or HI      Diagnostic Study Results     Labs -   No results found for this or any previous visit (from the past 12 hour(s)). Radiologic Studies -   No orders to display     CT Results  (Last 48 hours)    None        CXR Results  (Last 48 hours)    None          Medications given in the ED-  Medications   LORazepam (ATIVAN) tablet 1 mg (1 mg Oral Given 4/24/20 1726)         Medical Decision Making   I am the first provider for this patient. I reviewed the vital signs, available nursing notes, past medical history, past surgical history, family history and social history. Vital Signs-Reviewed the patient's vital signs. Pulse Oximetry Analysis -100 % on room air    EKG interpretation: (Preliminary)  EKG read by Dr. Kathryn Vale at 5:25 PM  Normal sinus rhythm at a rate of 98 bpm, CT interval 146 ms, QRS duration of 80 ms    Records Reviewed: Nursing Notes, Old Medical Records and Previous electrocardiograms    Provider Notes (Medical Decision Making): Melissa Goodpasture is a 34 y.o. male resenting for anxiety and panic attack. Patient has been struggling with this and is having a hard time getting into psychiatric follow-up.    consult placed to help patient. Patient improved slightly after p.o. Ativan here. EKG unchanged from prior. Patient is compliant with his Xarelto for his DVT. Plan for discharge with early primary care and [facility] follow-up and strict return precautions. Patient understands and agrees with this plan. Procedures:  Procedures    ED Course:   6:03 PM  Updated patient on all results and plan. All questions answered. Reports symptoms have improved. Diagnosis and Disposition     Critical Care: None    DISCHARGE NOTE:    Ewelina Kramer's  results have been reviewed with him. He has been counseled regarding his diagnosis, treatment, and plan. He verbally conveys understanding and agreement of the signs, symptoms, diagnosis, treatment and prognosis and additionally agrees to follow up as discussed. He also agrees with the care-plan and conveys that all of his questions have been answered. I have also provided discharge instructions for him that include: educational information regarding their diagnosis and treatment, and list of reasons why they would want to return to the ED prior to their follow-up appointment, should his condition change. He has been provided with education for proper emergency department utilization. CLINICAL IMPRESSION:    1. Anxiety state    2. Bipolar 1 disorder ([facility] [address]    3. Atypical chest pain        PLAN:  1. D/C Home  2. Current Discharge Medication List        3.    Follow-up Information     Follow up With Specialties Details Why Contact Info    [facility]  Schedule an appointment as soon as possible for a visit  [address] [facility], [address] [place] [address] [phone]    [address] [facility]  Schedule an appointment as soon as possible for a visit  [address], [address] [place] [address]    [facility] EMERGENCY DEPT Emergency Medicine  If symptoms worsen [address] [facility] [address] [phone] _______________________________      Please note that this dictation was completed with [employer], the computer voice recognition software. Quite often unanticipated grammatical, syntax, homophones, and other interpretive errors are inadvertently transcribed by the computer software. Please disregard these errors. Please excuse any errors that have escaped final proofreading.

## 2020-04-24 NOTE — ED TRIAGE NOTES
Pt to ED for intermittent chest pain and dyspnea for approx. 15min. Pt reports hx of DVT to left arm, reports being compliant with meds, denies diaphoresis, N/V/D, fever/chills. Pt reports being able to work all day without issue.

## 2020-04-24 NOTE — DISCHARGE INSTRUCTIONS
Patient Education        Anxiety Disorder: Care Instructions  Your Care Instructions    Anxiety is a normal reaction to stress. Difficult situations can cause you to have symptoms such as sweaty palms and a nervous feeling. In an anxiety disorder, the symptoms are far more severe. Constant worry, muscle tension, trouble sleeping, nausea and diarrhea, and other symptoms can make normal daily activities difficult or impossible. These symptoms may occur for no reason, and they can affect your work, school, or social life. Medicines, counseling, and self-care can all help. Follow-up care is a key part of your treatment and safety. Be sure to make and go to all appointments, and call your doctor if you are having problems. It's also a good idea to know your test results and keep a list of the medicines you take. How can you care for yourself at home? · Take medicines exactly as directed. Call your doctor if you think you are having a problem with your medicine. · Go to your counseling sessions and follow-up appointments. · Recognize and accept your anxiety. Then, when you are in a situation that makes you anxious, say to yourself, \"This is not an emergency. I feel uncomfortable, but I am not in danger. I can keep going even if I feel anxious. \"  · Be kind to your body:  ? Relieve tension with exercise or a massage. ? Get enough rest.  ? Avoid alcohol, caffeine, nicotine, and illegal drugs. They can increase your anxiety level and cause sleep problems. ? Learn and do relaxation techniques. See below for more about these techniques. · Engage your mind. Get out and do something you enjoy. Go to a funny movie, or take a walk or hike. Plan your day. Having too much or too little to do can make you anxious. · Keep a record of your symptoms. Discuss your fears with a good friend or family member, or join a support group for people with similar problems. Talking to others sometimes relieves stress.   · Get involved in social groups, or volunteer to help others. Being alone sometimes makes things seem worse than they are. · Get at least 30 minutes of exercise on most days of the week to relieve stress. Walking is a good choice. You also may want to do other activities, such as running, swimming, cycling, or playing tennis or team sports. Relaxation techniques  Do relaxation exercises 10 to 20 minutes a day. You can play soothing, relaxing music while you do them, if you wish. · Tell others in your house that you are going to do your relaxation exercises. Ask them not to disturb you. · Find a comfortable place, away from all distractions and noise. · Lie down on your back, or sit with your back straight. · Focus on your breathing. Make it slow and steady. · Breathe in through your nose. Breathe out through either your nose or mouth. · Breathe deeply, filling up the area between your navel and your rib cage. Breathe so that your belly goes up and down. · Do not hold your breath. · Breathe like this for 5 to 10 minutes. Notice the feeling of calmness throughout your whole body. As you continue to breathe slowly and deeply, relax by doing the following for another 5 to 10 minutes:  · Tighten and relax each muscle group in your body. You can begin at your toes and work your way up to your head. · Imagine your muscle groups relaxing and becoming heavy. · Empty your mind of all thoughts. · Let yourself relax more and more deeply. · Become aware of the state of calmness that surrounds you. · When your relaxation time is over, you can bring yourself back to alertness by moving your fingers and toes and then your hands and feet and then stretching and moving your entire body. Sometimes people fall asleep during relaxation, but they usually wake up shortly afterward. · Always give yourself time to return to full alertness before you drive a car or do anything that might cause an accident if you are not fully alert.  Never play a relaxation tape while you drive a car. When should you call for help? Call 911 anytime you think you may need emergency care. For example, call if:    · You feel you cannot stop from hurting yourself or someone else.   [place] Sierra the numbers for these national suicide hotlines: 8-490-198-TALK (1-160.553.4507) and 8-123-CUTIRZR (8-170.125.8690). If you or someone you know talks about suicide or feeling hopeless, get help right away.   Watch closely for changes in your health, and be sure to contact your doctor if:    · You have anxiety or fear that affects your life.     · You have symptoms of anxiety that are new or different from those you had before. Where can you learn more? Go to http://sujathaOdinOtvetdanielle.info/  Enter P754 in the search box to learn more about \"Anxiety Disorder: Care Instructions. \"  Current as of: May 28, 2019Content Version: 12.4  © 5212-9000 Healthwise, Incorporated. Care instructions adapted under license by [employer] (which disclaims liability or warranty for this information). If you have questions about a medical condition or this instruction, always ask your healthcare professional. [address] any warranty or liability for your use of this information. Patient Education        Chest Pain: Care Instructions  Your Care Instructions    There are many things that can cause chest pain. Some are not serious and will get better on their own in a few days. But some kinds of chest pain need more testing and treatment. Your doctor may have recommended a follow-up visit in the next 8 to 12 hours. If you are not getting better, you may need more tests or treatment. Even though your doctor has released you, you still need to watch for any problems. The doctor carefully checked you, but sometimes problems can develop later. If you have new symptoms or if your symptoms do not get better, get medical care right away.   If you have worse or different chest pain or pressure that lasts more than 5 minutes or you passed out (lost consciousness), call 911 or seek other emergency help right away. A medical visit is only one step in your treatment. Even if you feel better, you still need to do what your doctor recommends, such as going to all suggested follow-up appointments and taking medicines exactly as directed. This will help you recover and help prevent future problems. How can you care for yourself at home? · Rest until you feel better. · Take your medicine exactly as prescribed. Call your doctor if you think you are having a problem with your medicine. · Do not drive after taking a prescription pain medicine. When should you call for help? Call 911 if:    · You passed out (lost consciousness).     · You have severe difficulty breathing.     · You have symptoms of a heart attack. These may include:  ? Chest pain or pressure, or a strange feeling in your chest.  ? Sweating. ? Shortness of breath. ? Nausea or vomiting. ? Pain, pressure, or a strange feeling in your back, neck, jaw, or upper belly or in one or both shoulders or arms. ? Lightheadedness or sudden weakness. ? A fast or irregular heartbeat. After you call  911, the  may tell you to chew 1 adult-strength or 2 to 4 low-dose aspirin. Wait for an ambulance. Do not try to drive yourself.    Call your doctor today if:    · You have any trouble breathing.     · Your chest pain gets worse.     · You are dizzy or lightheaded, or you feel like you may faint.     · You are not getting better as expected.     · You are having new or different chest pain. Where can you learn more? Go to http://sujatha-danielle.info/  Enter A120 in the search box to learn more about \"Chest Pain: Care Instructions. \"  Current as of: June 26, 2019Content Version: 12.4  © 2902-4864 Healthwise, Incorporated.   Care instructions adapted under license by [employer] (which disclaims liability or warranty for this information). If you have questions about a medical condition or this instruction, always ask your healthcare professional. [address] any warranty or liability for your use of this information.

## 2020-04-26 ENCOUNTER — HOSPITAL ENCOUNTER (EMERGENCY)
Age: 30
Discharge: HOME | End: 2020-04-26
Attending: EMERGENCY MEDICINE
Payer: MEDICAID

## 2020-04-26 VITALS
BODY MASS INDEX: 32.95 KG/M2 | HEART RATE: 82 BPM | SYSTOLIC BLOOD PRESSURE: 145 MMHG | DIASTOLIC BLOOD PRESSURE: 78 MMHG | HEIGHT: 66 IN | TEMPERATURE: 96.3 F | RESPIRATION RATE: 16 BRPM | WEIGHT: 205 LBS | OXYGEN SATURATION: 100 %

## 2020-04-26 DIAGNOSIS — Z76.5 DRUG-SEEKING BEHAVIOR: Primary | ICD-10-CM

## 2020-04-26 PROCEDURE — 99283 EMERGENCY DEPT VISIT LOW MDM: CPT

## 2020-04-27 ENCOUNTER — PATIENT OUTREACH (OUTPATIENT)
Dept: CASE MANAGEMENT | Age: 30
End: 2020-04-27

## 2020-04-27 NOTE — ED TRIAGE NOTES
Pt arrives c/o anxiety and panic attack. Pt states that the only things that makes it better is ativan in the ER. Pt further states that he ran out of his zyprexa.

## 2020-04-27 NOTE — ED PROVIDER NOTES
EMERGENCY DEPARTMENT HISTORY AND PHYSICAL EXAM    Date: 4/26/2020  Patient Name: Josiah Elizabeth    History of Presenting Illness     Chief Complaint   Patient presents with    Anxiety       History Provided By: Patient    Chief Complaint: anxiety     Additional History (Context):   8:56 PM  Josiah Elizabeth is a 34 y.o. male with PMHX anxiety, polysubstance abuse, schizoaffective, bipolar, HIV, hepatitis C presents to the emergency department C/O anxiety attack. Patient states that this started earlier today. He is complaining of \"butterflies in his stomach and feeling like he cannot breathe\". Patient states he also ran out of his Zyprexa that was originally prescribed to him while he was in [facility]. He has a history of DVT and is currently taking Xarelto which he states he has been compliant with. During 1 of his recent emergency department visits he had a CTA performed which was negative for PE. Patient states he has had panic attacks in the past and this is consistent with prior and that he needs Ativan. He states this is the only thing that will help. He is also requesting a refill of his Zyprexa. He denies chest pain. PCP: None    Current Outpatient Medications   Medication Sig Dispense Refill    rivaroxaban (Xarelto) 15 mg (42)- 20 mg (9) DsPk Take one 15 mg tablet twice a day with food for the first 21 days. Then, take one 20 mg tablet once a day with food for 9 days. 1 Dose Pack 0    perphenazine (TRILAFON) 2 mg tablet Take 2 mg by mouth three (3) times daily.  OLANZapine (ZYPREXA) 15 mg tablet Take 15 mg by mouth nightly.  lithium carbonate 300 mg capsule Take  by mouth three (3) times daily (with meals).  hydrOXYzine pamoate (VISTARIL) 100 mg capsule Take 100 mg by mouth three (3) times daily as needed for Itching.  levothyroxine (SYNTHROID) 75 mcg tablet Take  by mouth Daily (before breakfast).  Indications: hypothyroidism      hydrOXYzine HCl (ATARAX) 50 mg tablet Take 1 Tab by mouth every six (6) hours as needed for Itching. 10 Tab 0    albuterol (PROVENTIL HFA, VENTOLIN HFA, PROAIR HFA) 90 mcg/actuation inhaler Take 2 Puffs by inhalation every four (4) hours as needed for Wheezing. 1 Inhaler 1    ALPRAZOLAM (XANAX PO) Take 1 mg by mouth. Past History     Past Medical History:  Past Medical History:   Diagnosis Date    Anxiety     Bipolar I disorder, current or most recent episode depressed, with psychotic features with anxious distress ([facility]     Hepatitis B     Hepatitis C     Hepatitis C, chronic (HCC)     Heroin abuse ([facility]     HIV (human immunodeficiency virus infection) ([facility]     pt denies (4/6/2020)    Infectious disease     Psychiatric disorder     Schizoaffective disorder, depressive type with good prognostic features ([facility]        Past Surgical History:  Past Surgical History:   Procedure Laterality Date    HX ANKLE FRACTURE TX         Family History:  History reviewed. No pertinent family history. Social History:  Social History     Tobacco Use    Smoking status: Former Smoker     Packs/day: 2.00    Smokeless tobacco: Never Used   Substance Use Topics    Alcohol use: Not Currently     Alcohol/week: 2.0 standard drinks     Types: 2 Cans of beer per week    Drug use: Not Currently     Types: Heroin, Cocaine, Prescription     Comment: Methadone at this time       Allergies: Allergies   Allergen Reactions    Tylenol [Acetaminophen] Other (comments)     Pt reports has Hep C cant take tylenol          Review of Systems   Review of Systems   Constitutional: Negative for chills and fever. Respiratory: Positive for shortness of breath. Cardiovascular: Negative for chest pain, palpitations and leg swelling. Gastrointestinal: Negative for abdominal pain. Neurological: Negative for weakness. Psychiatric/Behavioral: Negative for confusion. The patient is nervous/anxious. All other systems reviewed and are negative.       Physical Exam     Vitals: 04/26/20 2049   BP: 145/78   Pulse: 82   Resp: 16   Temp: 96.3 °F (35.7 °C)   SpO2: 100%   Weight: 93 kg (205 lb)   Height: 5' 6\" (1.676 m)     Physical Exam  Vitals signs and nursing note reviewed. Constitutional:       General: He is not in acute distress. Appearance: He is well-developed. Comments: Patient walking around, pacing the room during HPI, makes good eye contact, no increased work of breathing   HENT:      Head: Normocephalic and atraumatic. Comments: No stridor     Right Ear: Tympanic membrane, ear canal and external ear normal. Tympanic membrane is not perforated, erythematous, retracted or bulging. Left Ear: Tympanic membrane, ear canal and external ear normal. Tympanic membrane is not perforated, erythematous, retracted or bulging. Nose: Nose normal. No mucosal edema or rhinorrhea. Right Sinus: No maxillary sinus tenderness or frontal sinus tenderness. Left Sinus: No maxillary sinus tenderness or frontal sinus tenderness. Mouth/Throat:      Mouth: Mucous membranes are not dry. Pharynx: Uvula midline. No oropharyngeal exudate, posterior oropharyngeal erythema or uvula swelling. Tonsils: No tonsillar abscesses. Neck:      Musculoskeletal: Normal range of motion and neck supple. Cardiovascular:      Rate and Rhythm: Normal rate and regular rhythm. Heart sounds: Normal heart sounds. No murmur. Comments: Regular rate and rhythm, no murmur rub or gallop  Pulmonary:      Effort: Pulmonary effort is normal. No respiratory distress. Breath sounds: Normal breath sounds. No wheezing or rales. Comments: Lungs are clear to auscultation bilaterally, no rhonchi wheezing or rales  Lymphadenopathy:      Cervical: No cervical adenopathy. Skin:     General: Skin is warm and dry. Findings: No rash. Neurological:      Mental Status: He is alert and oriented to person, place, and time.       Comments: Patient is alert, pacing around room    Psychiatric:         Judgment: Judgment normal.         Diagnostic Study Results     Labs:   No results found for this or any previous visit (from the past 12 hour(s)). Radiologic Studies:   No orders to display     CT Results  (Last 48 hours)    None        CXR Results  (Last 48 hours)    None          Medical Decision Making   I am the first provider for this patient. I reviewed the vital signs, available nursing notes, past medical history, past surgical history, family history and social history. Vital Signs: Reviewed the patient's vital signs. Pulse Oximetry Analysis: 100% on RA       Records Reviewed: Nursing Notes and Old Medical Records    Procedures:  Procedures    ED Course:   8:56 PM Initial assessment performed. The patients presenting problems have been discussed, and they are in agreement with the care plan formulated and outlined with them. I have encouraged them to ask questions as they arise throughout their visit. Patient presents complaining of anxiety attack. Patient has been seen 5 times in the ER this month for anxiety. He was recently diagnosed with a DVT and has been taking Xarelto. He has had CTA recently during 1 of these visits that was negative. Vital signs are stable at this time he is not hypoxic or tachycardic. Upon entry into the room patient is pacing back and forth in the room and appears very agitated. Patient goes on to state he needs Ativan for his panic attacks and that that is the only thing that helps him. States he has been given Vistaril and Atarax in the past that do not help. He is also requesting refill of his Zyprexa as he ran out. Explained that we would not be able to give Ativan for his panic attack however I could give us mall refill of his Zyprexa until he is able to be seen by [facility].   Patient became very upset at that time stating \" if you just give me Ativan this 1 last time I will stop coming to the ER\" again I explained that we are unable to manage this chronic condition with controlled substances from the emergency department. Patient became upset and said that he would just go somewhere else to get the Ativan at which point he left prior to receiving his discharge instructions. Case was discussed with ED attending Sarah Brown. Gertrude Washburn MD, agrees with management at this time      Diagnosis and Disposition     DISCHARGE NOTE:  [address] [facility]  results have been reviewed with him. He has been counseled regarding his diagnosis, treatment, and plan. He verbally conveys understanding and agreement of the signs, symptoms, diagnosis, treatment and prognosis and additionally agrees to follow up as discussed. He also agrees with the care-plan and conveys that all of his questions have been answered. I have also provided discharge instructions for him that include: educational information regarding their diagnosis and treatment, and list of reasons why they would want to return to the ED prior to their follow-up appointment, should his condition change. He has been provided with education for proper emergency department utilization. CLINICAL IMPRESSION:    1. Drug-seeking behavior        PLAN:  1. D/C Home  2. Discharge Medication List as of 4/26/2020  9:23 PM        3. Follow-up Information     Follow up With Specialties Details Why Contact Info    [facility]   call for follow up and recheck  [address] [facility], [address] [place] [address]  [phone]    [facility] EMERGENCY DEPT Emergency Medicine  If symptoms worsen [address] [place] [id number]  [phone]              Please note that this dictation was completed with [employer], the computer voice recognition software. Quite often unanticipated grammatical, syntax, homophones, and other interpretive errors are inadvertently transcribed by the computer software. Please disregard these errors.   Please excuse any errors that have escaped final proofreading.

## 2020-05-23 ENCOUNTER — APPOINTMENT (OUTPATIENT)
Dept: GENERAL RADIOLOGY | Age: 30
End: 2020-05-23
Attending: EMERGENCY MEDICINE
Payer: MEDICAID

## 2020-05-23 ENCOUNTER — HOSPITAL ENCOUNTER (EMERGENCY)
Age: 30
Discharge: HOME | End: 2020-05-24
Attending: EMERGENCY MEDICINE
Payer: MEDICAID

## 2020-05-23 DIAGNOSIS — R60.1 ANASARCA: Primary | ICD-10-CM

## 2020-05-23 LAB
ALBUMIN SERPL-MCNC: 3.5 G/DL (ref 3.4–5)
ALBUMIN/GLOB SERPL: 1.2 {RATIO} (ref 0.8–1.7)
ALP SERPL-CCNC: 105 U/L (ref 45–117)
ALT SERPL-CCNC: 134 U/L (ref 16–61)
AMPHET UR QL SCN: NEGATIVE
ANION GAP SERPL CALC-SCNC: 5 MMOL/L (ref 3–18)
APPEARANCE UR: CLEAR
AST SERPL-CCNC: 106 U/L (ref 10–38)
BARBITURATES UR QL SCN: NEGATIVE
BASOPHILS # BLD: 0 K/UL (ref 0–0.1)
BASOPHILS NFR BLD: 0 % (ref 0–2)
BENZODIAZ UR QL: NEGATIVE
BILIRUB SERPL-MCNC: 0.6 MG/DL (ref 0.2–1)
BILIRUB UR QL: NEGATIVE
BNP SERPL-MCNC: 81 PG/ML (ref 0–450)
BUN SERPL-MCNC: 10 MG/DL (ref 7–18)
BUN/CREAT SERPL: 9 (ref 12–20)
CALCIUM SERPL-MCNC: 8.7 MG/DL (ref 8.5–10.1)
CANNABINOIDS UR QL SCN: POSITIVE
CHLORIDE SERPL-SCNC: 108 MMOL/L (ref 100–111)
CO2 SERPL-SCNC: 30 MMOL/L (ref 21–32)
COCAINE UR QL SCN: NEGATIVE
COLOR UR: YELLOW
CREAT SERPL-MCNC: 1.09 MG/DL (ref 0.6–1.3)
DIFFERENTIAL METHOD BLD: ABNORMAL
EOSINOPHIL # BLD: 0.3 K/UL (ref 0–0.4)
EOSINOPHIL NFR BLD: 5 % (ref 0–5)
ERYTHROCYTE [DISTWIDTH] IN BLOOD BY AUTOMATED COUNT: 13.6 % (ref 11.6–14.5)
ETHANOL SERPL-MCNC: <3 MG/DL (ref 0–3)
GLOBULIN SER CALC-MCNC: 2.9 G/DL (ref 2–4)
GLUCOSE BLD STRIP.AUTO-MCNC: 99 MG/DL (ref 70–110)
GLUCOSE SERPL-MCNC: 95 MG/DL (ref 74–99)
GLUCOSE UR STRIP.AUTO-MCNC: NEGATIVE MG/DL
HCT VFR BLD AUTO: 42.7 % (ref 36–48)
HDSCOM,HDSCOM: ABNORMAL
HGB BLD-MCNC: 14.3 G/DL (ref 13–16)
HGB UR QL STRIP: NEGATIVE
KETONES UR QL STRIP.AUTO: NEGATIVE MG/DL
LEUKOCYTE ESTERASE UR QL STRIP.AUTO: NEGATIVE
LIPASE SERPL-CCNC: 68 U/L (ref 73–393)
LYMPHOCYTES # BLD: 1.8 K/UL (ref 0.9–3.6)
LYMPHOCYTES NFR BLD: 26 % (ref 21–52)
MAGNESIUM SERPL-MCNC: 1.8 MG/DL (ref 1.6–2.6)
MCH RBC QN AUTO: 29.4 PG (ref 24–34)
MCHC RBC AUTO-ENTMCNC: 33.5 G/DL (ref 31–37)
MCV RBC AUTO: 87.7 FL (ref 74–97)
METHADONE UR QL: POSITIVE
MONOCYTES # BLD: 1 K/UL (ref 0.05–1.2)
MONOCYTES NFR BLD: 14 % (ref 3–10)
NEUTS SEG # BLD: 4 K/UL (ref 1.8–8)
NEUTS SEG NFR BLD: 55 % (ref 40–73)
NITRITE UR QL STRIP.AUTO: NEGATIVE
OPIATES UR QL: NEGATIVE
PCP UR QL: NEGATIVE
PH UR STRIP: 5 [PH] (ref 5–8)
PLATELET # BLD AUTO: 219 K/UL (ref 135–420)
PMV BLD AUTO: 8.9 FL (ref 9.2–11.8)
POTASSIUM SERPL-SCNC: 3.8 MMOL/L (ref 3.5–5.5)
PROT SERPL-MCNC: 6.4 G/DL (ref 6.4–8.2)
PROT UR STRIP-MCNC: NEGATIVE MG/DL
RBC # BLD AUTO: 4.87 M/UL (ref 4.7–5.5)
SODIUM SERPL-SCNC: 143 MMOL/L (ref 136–145)
SP GR UR REFRACTOMETRY: 1.01 (ref 1–1.03)
UROBILINOGEN UR QL STRIP.AUTO: 0.2 EU/DL (ref 0.2–1)
WBC # BLD AUTO: 7.1 K/UL (ref 4.6–13.2)

## 2020-05-23 PROCEDURE — 80053 COMPREHEN METABOLIC PANEL: CPT

## 2020-05-23 PROCEDURE — 83690 ASSAY OF LIPASE: CPT

## 2020-05-23 PROCEDURE — 96374 THER/PROPH/DIAG INJ IV PUSH: CPT

## 2020-05-23 PROCEDURE — 83735 ASSAY OF MAGNESIUM: CPT

## 2020-05-23 PROCEDURE — 80307 DRUG TEST PRSMV CHEM ANLYZR: CPT

## 2020-05-23 PROCEDURE — 85025 COMPLETE CBC W/AUTO DIFF WBC: CPT

## 2020-05-23 PROCEDURE — 82962 GLUCOSE BLOOD TEST: CPT

## 2020-05-23 PROCEDURE — 71045 X-RAY EXAM CHEST 1 VIEW: CPT

## 2020-05-23 PROCEDURE — 74011250636 HC RX REV CODE- 250/636: Performed by: EMERGENCY MEDICINE

## 2020-05-23 PROCEDURE — 99285 EMERGENCY DEPT VISIT HI MDM: CPT

## 2020-05-23 PROCEDURE — 93005 ELECTROCARDIOGRAM TRACING: CPT

## 2020-05-23 PROCEDURE — 83880 ASSAY OF NATRIURETIC PEPTIDE: CPT

## 2020-05-23 PROCEDURE — 81003 URINALYSIS AUTO W/O SCOPE: CPT

## 2020-05-23 RX ORDER — FUROSEMIDE 40 MG/1
40 TABLET ORAL DAILY
Qty: 7 TAB | Refills: 0 | Status: SHIPPED | OUTPATIENT
Start: 2020-05-23 | End: 2020-05-30

## 2020-05-23 RX ORDER — FUROSEMIDE 10 MG/ML
20 INJECTION INTRAMUSCULAR; INTRAVENOUS ONCE
Status: COMPLETED | OUTPATIENT
Start: 2020-05-23 | End: 2020-05-23

## 2020-05-23 RX ADMIN — FUROSEMIDE 20 MG: 10 INJECTION, SOLUTION INTRAMUSCULAR; INTRAVENOUS at 20:38

## 2020-05-23 NOTE — ED PROVIDER NOTES
EMERGENCY DEPARTMENT HISTORY AND PHYSICAL EXAM    Date: 5/23/2020  Patient Name: Anamaria Smith    History of Presenting Illness     Chief Complaint   Patient presents with    Swelling         History Provided By: Patient    Asad Shah is a 34 y.o. male with PMHX of schizoaffective disorder, hepatitis C, polysubstance abuse, heroin abuse, UE dvt (no longer on [facility]), anxiety disorder, drug-seeking behavior who presents to the emergency department C/O swelling. She reports that since yesterday his entire body has been swelling. But has been normal.  Patient is drowsy and off during evaluation. Reports he took Klonopin and methadone prior to arrival.  Denies IV drug use recently    PCP: None    Current Outpatient Medications   Medication Sig Dispense Refill    furosemide (Lasix) 40 mg tablet Take 1 Tab by mouth daily for 7 days. 7 Tab 0    rivaroxaban (Xarelto) 15 mg (42)- 20 mg (9) DsPk Take one 15 mg tablet twice a day with food for the first 21 days. Then, take one 20 mg tablet once a day with food for 9 days. 1 Dose Pack 0    perphenazine (TRILAFON) 2 mg tablet Take 2 mg by mouth three (3) times daily.  OLANZapine (ZYPREXA) 15 mg tablet Take 15 mg by mouth nightly.  lithium carbonate 300 mg capsule Take  by mouth three (3) times daily (with meals).  hydrOXYzine pamoate (VISTARIL) 100 mg capsule Take 100 mg by mouth three (3) times daily as needed for Itching.  levothyroxine (SYNTHROID) 75 mcg tablet Take  by mouth Daily (before breakfast). Indications: hypothyroidism      hydrOXYzine HCl (ATARAX) 50 mg tablet Take 1 Tab by mouth every six (6) hours as needed for Itching. 10 Tab 0    albuterol (PROVENTIL HFA, VENTOLIN HFA, PROAIR HFA) 90 mcg/actuation inhaler Take 2 Puffs by inhalation every four (4) hours as needed for Wheezing. 1 Inhaler 1    ALPRAZOLAM (XANAX PO) Take 1 mg by mouth.          Past History     Past Medical History:  Past Medical History:   Diagnosis Date    Anxiety     Bipolar I disorder, current or most recent episode depressed, with psychotic features with anxious distress (HCC)     Hepatitis B     Hepatitis C     Hepatitis C, chronic (HCC)     [facility] abuse [facility]     HIV (human immunodeficiency virus infection) ([facility]     pt denies (4/6/2020)    Infectious disease     Psychiatric disorder     Schizoaffective disorder, depressive type with good prognostic features ([facility]        Past Surgical History:  Past Surgical History:   Procedure Laterality Date    HX ANKLE FRACTURE TX         Family History:  No family history on file. Social History:  Social History     Tobacco Use    Smoking status: Current Every Day Smoker     Packs/day: 2.00    Smokeless tobacco: Never Used   Substance Use Topics    Alcohol use: Not Currently     Alcohol/week: 2.0 standard drinks     Types: 2 Cans of beer per week    Drug use: Yes     Types: Marijuana     Comment: Methadone at this time       Allergies: Allergies   Allergen Reactions    Tylenol [Acetaminophen] Other (comments)     Pt reports has Hep C cant take tylenol            Review of Systems   Review of Systems   Unable to perform ROS: Mental status change   Constitutional: Negative for chills and fever. Respiratory: Negative for shortness of breath. Gastrointestinal: Negative for abdominal pain and vomiting. Genitourinary: Negative for difficulty urinating. Skin: Positive for wound (compression wound from sock). Physical Exam     Vitals:    05/23/20 1940   BP: 115/72   Pulse: 86   Resp: 14   Temp: 99 °F (37.2 °C)   SpO2: 95%   Weight: 90.7 kg (200 lb)   Height: 5' 6\" (1.676 m)     Physical Exam  Vitals signs reviewed. Constitutional:       General: He is not in acute distress. Appearance: He is not toxic-appearing. Comments: Wakens to voice, dozing off, poorly groomed, covered in dirt/grass   HENT:      Head: Normocephalic and atraumatic.    Eyes:      Pupils: Pupils are equal, round, and reactive to light. Comments: Pupils 4-5mm reactice     Neck:      Musculoskeletal: Neck supple. Cardiovascular:      Rate and Rhythm: Normal rate and regular rhythm. Pulses: Normal pulses. Heart sounds: Normal heart sounds. Pulmonary:      Effort: Pulmonary effort is normal.      Breath sounds: Normal breath sounds. Abdominal:      General: Bowel sounds are normal. There is no distension. Palpations: Abdomen is soft. Tenderness: There is no abdominal tenderness. Musculoskeletal:         General: Swelling present. Right lower leg: Edema (nonpitting) present. Left lower leg: Edema present. Skin:     General: Skin is warm and dry. Capillary Refill: Capillary refill takes less than 2 seconds. Findings: Rash (band like wound left medial leg) present. Neurological:      Comments: Cognition delayed             Diagnostic Study Results     Labs -     Recent Results (from the past 12 hour(s))   CBC WITH AUTOMATED DIFF    Collection Time: 05/23/20  8:00 PM   Result Value Ref Range    WBC 7.1 4.6 - 13.2 K/uL    RBC 4.87 4.70 - 5.50 M/uL    HGB 14.3 13.0 - 16.0 g/dL    HCT 42.7 36.0 - 48.0 %    MCV 87.7 74.0 - 97.0 FL    MCH 29.4 24.0 - 34.0 PG    MCHC 33.5 31.0 - 37.0 g/dL    RDW 13.6 11.6 - 14.5 %    PLATELET 616 724 - 248 K/uL    MPV 8.9 (L) 9.2 - 11.8 FL    NEUTROPHILS 55 40 - 73 %    LYMPHOCYTES 26 21 - 52 %    MONOCYTES 14 (H) 3 - 10 %    EOSINOPHILS 5 0 - 5 %    BASOPHILS 0 0 - 2 %    ABS. NEUTROPHILS 4.0 1.8 - 8.0 K/UL    ABS. LYMPHOCYTES 1.8 0.9 - 3.6 K/UL    ABS. MONOCYTES 1.0 0.05 - 1.2 K/UL    ABS. EOSINOPHILS 0.3 0.0 - 0.4 K/UL    ABS.  BASOPHILS 0.0 0.0 - 0.1 K/UL    DF AUTOMATED     METABOLIC PANEL, COMPREHENSIVE    Collection Time: 05/23/20  8:00 PM   Result Value Ref Range    Sodium 143 136 - 145 mmol/L    Potassium 3.8 3.5 - 5.5 mmol/L    Chloride 108 100 - 111 mmol/L    CO2 30 21 - 32 mmol/L    Anion gap 5 3.0 - 18 mmol/L    Glucose 95 74 - 99 mg/dL BUN 10 7.0 - 18 MG/DL    Creatinine 1.09 0.6 - 1.3 MG/DL    BUN/Creatinine ratio 9 (L) 12 - 20      GFR est AA >60 >60 ml/min/1.73m2    GFR est non-AA >60 >60 ml/min/1.73m2    Calcium 8.7 8.5 - 10.1 MG/DL    Bilirubin, total 0.6 0.2 - 1.0 MG/DL    ALT (SGPT) 134 (H) 16 - 61 U/L    AST (SGOT) 106 (H) 10 - 38 U/L    Alk.  phosphatase 105 45 - 117 U/L    Protein, total 6.4 6.4 - 8.2 g/dL    Albumin 3.5 3.4 - 5.0 g/dL    Globulin 2.9 2.0 - 4.0 g/dL    A-G Ratio 1.2 0.8 - 1.7     MAGNESIUM    Collection Time: 05/23/20  8:00 PM   Result Value Ref Range    Magnesium 1.8 1.6 - 2.6 mg/dL   LIPASE    Collection Time: 05/23/20  8:00 PM   Result Value Ref Range    Lipase 68 (L) 73 - 393 U/L   ETHYL ALCOHOL    Collection Time: 05/23/20  8:00 PM   Result Value Ref Range    ALCOHOL(ETHYL),SERUM <3 0 - 3 MG/DL   NT-PRO BNP    Collection Time: 05/23/20  8:00 PM   Result Value Ref Range    NT pro-BNP 81 0 - 450 PG/ML   GLUCOSE, POC    Collection Time: 05/23/20  8:05 PM   Result Value Ref Range    Glucose (POC) 99 70 - 110 mg/dL   URINALYSIS W/ RFLX MICROSCOPIC    Collection Time: 05/23/20  9:15 PM   Result Value Ref Range    Color YELLOW      Appearance CLEAR      Specific gravity 1.011 1.005 - 1.030      pH (UA) 5.0 5.0 - 8.0      Protein Negative NEG mg/dL    Glucose Negative NEG mg/dL    Ketone Negative NEG mg/dL    Bilirubin Negative NEG      Blood Negative NEG      Urobilinogen 0.2 0.2 - 1.0 EU/dL    Nitrites Negative NEG      Leukocyte Esterase Negative NEG     DRUG SCREEN, URINE    Collection Time: 05/23/20  9:15 PM   Result Value Ref Range    BENZODIAZEPINES Negative NEG      BARBITURATES Negative NEG      THC (TH-CANNABINOL) Positive (A) NEG      OPIATES Negative NEG      PCP(PHENCYCLIDINE) Negative NEG      COCAINE Negative NEG      AMPHETAMINES Negative NEG      METHADONE Positive (A) NEG      HDSCOM (NOTE)        Radiologic Studies -   XR CHEST PORT    (Results Pending)     CT Results  (Last 48 hours)    None        CXR Results  (Last 48 hours)    None          Medications given in the ED-  Medications   furosemide (LASIX) injection 20 mg (20 mg IntraVENous Given 5/23/20 2038)         Medical Decision Making   I am the first provider for this patient. I reviewed the vital signs, available nursing notes, past medical history, past surgical history, family history and social history. Vital Signs-Reviewed the patient's vital signs. Pulse Oximetry Analysis - 95% on RA, low normal     Cardiac Monitor:  Rate: 82 bpm  Rhythm: NSR    EKG interpretation: (Preliminary)  EKG read by Dr. Juventino Ormond at 1914   Normal sinus rhythm rate of 81, normal intervals, normal axis, normal EKG    Records Reviewed: Nursing Notes and Old Medical Records    Provider Notes (Medical Decision Making): Orlando Lennox is a 34 y.o. male here with diffuse edema. Patient well-known to this ER. He has bilateral hand and lower extremity edema that is symmetric and nonpitting. Broad differential plan will send screening labs for anasarca    Procedures:  Procedures    ED Course:   9:32 PM  Blood work unremarkable. Chemistry reveals normal renal function. He has a transaminitis which is stable. He has normal protein albumin. He is does not have protein in his urine. This x-ray limited by portable view however does not appear fluid overloaded. proBNP low. Do not suspect DVT or PE although patient does have a reported history of upper extremity DVT. He has bilateral and symmetric swelling of his upper and lower extremities I do not think he has a hepatic or central thrombus. Placed on short course of Lasix. Patient has a history of polysubstance abuse and is on multiple medications which I think may be etiology/allergic component to swelling. Additionally patient works outside and has been very warm weather so I suspect over all this is most likely heat edema.   Encourage patient to follow-up with his primary care physician soon as possible. Diagnosis and Disposition     Critical Care:     DISCHARGE NOTE:    Graciela Kramer's  results have been reviewed with him. He has been counseled regarding his diagnosis, treatment, and plan. He verbally conveys understanding and agreement of the signs, symptoms, diagnosis, treatment and prognosis and additionally agrees to follow up as discussed. He also agrees with the care-plan and conveys that all of his questions have been answered. I have also provided discharge instructions for him that include: educational information regarding their diagnosis and treatment, and list of reasons why they would want to return to the ED prior to their follow-up appointment, should his condition change. He has been provided with education for proper emergency department utilization. CLINICAL IMPRESSION:    1. Anasarca        PLAN:  1. D/C Home  2. Current Discharge Medication List      START taking these medications    Details   furosemide (Lasix) 40 mg tablet Take 1 Tab by mouth daily for 7 days. Qty: 7 Tab, Refills: 0           3. Follow-up Information    None       _______________________________      Please note that this dictation was completed with [employer], the computer voice recognition software. Quite often unanticipated grammatical, syntax, homophones, and other interpretive errors are inadvertently transcribed by the computer software. Please disregard these errors. Please excuse any errors that have escaped final proofreading.

## 2020-05-23 NOTE — ED TRIAGE NOTES
Pt presents to ED w/ c/o swelling to arms and legs. Pt reports anxiety disorder. Pt is falling asleep in triage but responds to my voice. Pt has significant swelling to all extremities with pressure wounds from his socks.

## 2020-05-24 VITALS
BODY MASS INDEX: 32.14 KG/M2 | HEART RATE: 72 BPM | SYSTOLIC BLOOD PRESSURE: 103 MMHG | TEMPERATURE: 99 F | OXYGEN SATURATION: 92 % | RESPIRATION RATE: 14 BRPM | WEIGHT: 200 LBS | HEIGHT: 66 IN | DIASTOLIC BLOOD PRESSURE: 62 MMHG

## 2020-05-24 NOTE — ED NOTES
Pt mother called and spoke with nurse;  Pt has not been complaint with his blood thinners per mother;  Provider aware

## 2020-05-24 NOTE — ED NOTES
Pt placed on bedside CM showing SR pt alert to speech;  Vss;  EKG and INT with labs drawn;  SR up x's 2 for safety;  Door left cracked to visual pt;  Call bell within reach;  [facility]

## 2020-05-24 NOTE — DISCHARGE INSTRUCTIONS
Please take the lasix as prescribed for one week. You need to follow up with your primary care doctor next week.

## 2020-05-24 NOTE — ED NOTES
Pt discharged home stable and ambulatory to [address] [place] [address] without incident. Pain level at discharge 0/10. Pt discharged with mother. Reviewed discharged instructions with pt who verbalized understanding.   Patient armband removed and shredded

## 2020-05-24 NOTE — ED NOTES
Pt up and ambulatory to [address] [place] [address] with steady gait and no assistance;   Pt ready for discharge; mother called to pick him up

## 2020-05-24 NOTE — ED NOTES
attempted to get pt up and walk without assistance; Pt very drowsy and unable to stand on his own;   Pt placed back in bed with SR up x's 2 and call bell within reach

## 2020-05-27 ENCOUNTER — PATIENT OUTREACH (OUTPATIENT)
Dept: CASE MANAGEMENT | Age: 30
End: 2020-05-27

## 2020-05-27 NOTE — PROGRESS NOTES
Date/Time:  5/27/2020 10:29 AM  Attempted to reach patient by telephone. Left HIPPA compliant message requesting a return call. Will attempt to reach patient again.

## 2020-05-28 LAB
ATRIAL RATE: 81 BPM
CALCULATED P AXIS, ECG09: 56 DEGREES
CALCULATED R AXIS, ECG10: 43 DEGREES
CALCULATED T AXIS, ECG11: 23 DEGREES
DIAGNOSIS, 93000: NORMAL
P-R INTERVAL, ECG05: 140 MS
Q-T INTERVAL, ECG07: 406 MS
QRS DURATION, ECG06: 92 MS
QTC CALCULATION (BEZET), ECG08: 471 MS
VENTRICULAR RATE, ECG03: 81 BPM

## 2020-05-29 ENCOUNTER — PATIENT OUTREACH (OUTPATIENT)
Dept: CASE MANAGEMENT | Age: 30
End: 2020-05-29

## 2020-07-07 ENCOUNTER — HOSPITAL ENCOUNTER (OUTPATIENT)
Dept: VASCULAR SURGERY | Age: 30
Discharge: HOME | End: 2020-07-07
Attending: INTERNAL MEDICINE
Payer: MEDICAID

## 2020-07-07 DIAGNOSIS — I82.602 ACUTE EMBOLISM AND THROMBOSIS OF VEIN OF LEFT UPPER EXTREMITY: ICD-10-CM

## 2020-07-07 PROCEDURE — 93971 EXTREMITY STUDY: CPT

## 2023-06-17 NOTE — PROGRESS NOTES
Patient contacted regarding recent discharge and COVID-19 risk   Patient has had multiple visits to the ED for anxiety related symptoms. CTN offered psychology today therapist finder. Patient open to the idea. E-mail sent with link to psychology today therapy finder  [employer]. [facility]/therapists  Care Transition Nurse/ Ambulatory Care Manager contacted the patient by telephone to perform post discharge assessment. Verified name and  with patient as identifiers. Patient has following risk factors of: heart failure. CTN/ACM reviewed discharge instructions, medical action plan and red flags related to discharge diagnosis. Reviewed and educated them on any new and changed medications related to discharge diagnosis. Advised obtaining a 90-day supply of all daily and as-needed medications. Education provided regarding infection prevention, and signs and symptoms of COVID-19 and when to seek medical attention with patient who verbalized understanding. Discussed exposure protocols and quarantine from [address] you at higher risk for severe illness  and given an opportunity for questions and concerns. The patient agrees to contact the COVID-19 hotline [phone] or PCP office for questions related to their healthcare. CTN/ACM provided contact information for future reference. From CDC: Are you at higher risk for severe illness?  Wash your hands often.  Avoid close contact (6 feet, which is about two arm lengths) with people who are sick.  Put distance between yourself and other people if COVID-19 is spreading in your community.  Clean and disinfect frequently touched surfaces.  Avoid all cruise travel and non-essential air travel.  Call your healthcare professional if you have concerns about COVID-19 and your underlying condition or if you are sick.     For more information on steps you can take to protect yourself, see CDC's How to Protect Yourself Patient/family/caregiver given information for [employer] and agrees to enroll no    Plan for follow-up call in 7-14 days based on severity of symptoms and risk factors. Home